# Patient Record
Sex: FEMALE | Race: WHITE | NOT HISPANIC OR LATINO | URBAN - METROPOLITAN AREA
[De-identification: names, ages, dates, MRNs, and addresses within clinical notes are randomized per-mention and may not be internally consistent; named-entity substitution may affect disease eponyms.]

---
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
## 2022-08-08 ENCOUNTER — OFFICE VISIT (OUTPATIENT)
Dept: FAMILY MEDICINE | Facility: CLINIC | Age: 27
End: 2022-08-08
Payer: COMMERCIAL

## 2022-08-08 VITALS
WEIGHT: 286 LBS | HEIGHT: 64 IN | RESPIRATION RATE: 16 BRPM | BODY MASS INDEX: 48.83 KG/M2 | HEART RATE: 74 BPM | SYSTOLIC BLOOD PRESSURE: 126 MMHG | DIASTOLIC BLOOD PRESSURE: 83 MMHG | TEMPERATURE: 97.9 F | OXYGEN SATURATION: 95 %

## 2022-08-08 DIAGNOSIS — Z12.4 SCREENING FOR CERVICAL CANCER: ICD-10-CM

## 2022-08-08 DIAGNOSIS — F32.A DEPRESSION, UNSPECIFIED DEPRESSION TYPE: ICD-10-CM

## 2022-08-08 DIAGNOSIS — Z13.220 LIPID SCREENING: ICD-10-CM

## 2022-08-08 DIAGNOSIS — Z11.3 ROUTINE SCREENING FOR STI (SEXUALLY TRANSMITTED INFECTION): ICD-10-CM

## 2022-08-08 DIAGNOSIS — F41.1 GENERALIZED ANXIETY DISORDER: ICD-10-CM

## 2022-08-08 DIAGNOSIS — Z11.59 NEED FOR HEPATITIS C SCREENING TEST: ICD-10-CM

## 2022-08-08 DIAGNOSIS — Z13.1 SCREENING FOR DIABETES MELLITUS: ICD-10-CM

## 2022-08-08 DIAGNOSIS — Z00.00 ROUTINE GENERAL MEDICAL EXAMINATION AT A HEALTH CARE FACILITY: Primary | ICD-10-CM

## 2022-08-08 DIAGNOSIS — Z11.4 SCREENING FOR HIV (HUMAN IMMUNODEFICIENCY VIRUS): ICD-10-CM

## 2022-08-08 LAB
CHOLEST SERPL-MCNC: 232 MG/DL
FASTING STATUS PATIENT QL REPORTED: YES
GLUCOSE SERPL-MCNC: 103 MG/DL (ref 70–99)
HDLC SERPL-MCNC: 41 MG/DL
LDLC SERPL CALC-MCNC: 146 MG/DL
NONHDLC SERPL-MCNC: 191 MG/DL
TRIGL SERPL-MCNC: 227 MG/DL

## 2022-08-08 PROCEDURE — 82947 ASSAY GLUCOSE BLOOD QUANT: CPT | Performed by: FAMILY MEDICINE

## 2022-08-08 PROCEDURE — 87389 HIV-1 AG W/HIV-1&-2 AB AG IA: CPT | Performed by: FAMILY MEDICINE

## 2022-08-08 PROCEDURE — G0145 SCR C/V CYTO,THINLAYER,RESCR: HCPCS | Performed by: FAMILY MEDICINE

## 2022-08-08 PROCEDURE — 87491 CHLMYD TRACH DNA AMP PROBE: CPT | Performed by: FAMILY MEDICINE

## 2022-08-08 PROCEDURE — 87591 N.GONORRHOEAE DNA AMP PROB: CPT | Performed by: FAMILY MEDICINE

## 2022-08-08 PROCEDURE — 80061 LIPID PANEL: CPT | Performed by: FAMILY MEDICINE

## 2022-08-08 PROCEDURE — 86803 HEPATITIS C AB TEST: CPT | Performed by: FAMILY MEDICINE

## 2022-08-08 ASSESSMENT — ENCOUNTER SYMPTOMS
PARESTHESIAS: 0
HEARTBURN: 0
NAUSEA: 0
CONSTIPATION: 0
NERVOUS/ANXIOUS: 1
WEAKNESS: 0
EYE PAIN: 0
FEVER: 0
ARTHRALGIAS: 0
ABDOMINAL PAIN: 0
SORE THROAT: 0
BREAST MASS: 0
SHORTNESS OF BREATH: 0
DIARRHEA: 0
DIZZINESS: 0
HEMATURIA: 0
FREQUENCY: 0
HEADACHES: 1
PALPITATIONS: 0
DYSURIA: 0
JOINT SWELLING: 0
HEMATOCHEZIA: 0
COUGH: 0
CHILLS: 0
MYALGIAS: 0

## 2022-08-08 ASSESSMENT — PATIENT HEALTH QUESTIONNAIRE - PHQ9
SUM OF ALL RESPONSES TO PHQ QUESTIONS 1-9: 14
SUM OF ALL RESPONSES TO PHQ QUESTIONS 1-9: 14
10. IF YOU CHECKED OFF ANY PROBLEMS, HOW DIFFICULT HAVE THESE PROBLEMS MADE IT FOR YOU TO DO YOUR WORK, TAKE CARE OF THINGS AT HOME, OR GET ALONG WITH OTHER PEOPLE: SOMEWHAT DIFFICULT
5. POOR APPETITE OR OVEREATING: MORE THAN HALF THE DAYS

## 2022-08-08 ASSESSMENT — ANXIETY QUESTIONNAIRES
7. FEELING AFRAID AS IF SOMETHING AWFUL MIGHT HAPPEN: MORE THAN HALF THE DAYS
5. BEING SO RESTLESS THAT IT IS HARD TO SIT STILL: NOT AT ALL
IF YOU CHECKED OFF ANY PROBLEMS ON THIS QUESTIONNAIRE, HOW DIFFICULT HAVE THESE PROBLEMS MADE IT FOR YOU TO DO YOUR WORK, TAKE CARE OF THINGS AT HOME, OR GET ALONG WITH OTHER PEOPLE: SOMEWHAT DIFFICULT
1. FEELING NERVOUS, ANXIOUS, OR ON EDGE: SEVERAL DAYS
GAD7 TOTAL SCORE: 10
GAD7 TOTAL SCORE: 10
2. NOT BEING ABLE TO STOP OR CONTROL WORRYING: SEVERAL DAYS
3. WORRYING TOO MUCH ABOUT DIFFERENT THINGS: MORE THAN HALF THE DAYS
6. BECOMING EASILY ANNOYED OR IRRITABLE: MORE THAN HALF THE DAYS

## 2022-08-08 ASSESSMENT — PAIN SCALES - GENERAL: PAINLEVEL: NO PAIN (0)

## 2022-08-08 NOTE — NURSING NOTE
"26 year old  Chief Complaint   Patient presents with     New Patient     Physical     Patient is fasting and wants to screen for STI and she is due for Pap smear. Also might need to replace her IUD or seek different options for birth control.       Blood pressure 126/83, pulse 74, temperature 97.9  F (36.6  C), temperature source Temporal, resp. rate 16, height 1.635 m (5' 4.37\"), weight 129.7 kg (286 lb), last menstrual period 07/15/2022, SpO2 95 %. Body mass index is 48.53 kg/m .  There is no problem list on file for this patient.      Wt Readings from Last 2 Encounters:   08/08/22 129.7 kg (286 lb)     BP Readings from Last 3 Encounters:   08/08/22 126/83         Current Outpatient Medications   Medication     buPROPion HCl (WELLBUTRIN PO)     levonorgestrel (KYLEENA) 19.5 MG IUD     Multiple Vitamin (MULTIVITAMINS PO)     SERTRALINE HCL PO     No current facility-administered medications for this visit.       Social History     Tobacco Use     Smoking status: Never Smoker     Smokeless tobacco: Never Used   Substance Use Topics     Alcohol use: Yes     Comment: less than 1 drink a week.     Drug use: Yes     Types: Other     Comment: Delta 8 gummie, 5 mg, 4 times a week.       Health Maintenance Due   Topic Date Due     PREVENTIVE CARE VISIT  Never done     ADVANCE CARE PLANNING  Never done     HIV SCREENING  Never done     HEPATITIS C SCREENING  Never done     PAP  Never done       No results found for: PAP      Lauren Espinosa CMA, [facility]  August 8, 2022 9:31 AM  "

## 2022-08-09 LAB
C TRACH DNA SPEC QL NAA+PROBE: NEGATIVE
HCV AB SERPL QL IA: NONREACTIVE
HIV 1+2 AB+HIV1 P24 AG SERPL QL IA: NONREACTIVE
N GONORRHOEA DNA SPEC QL NAA+PROBE: NEGATIVE

## 2022-08-09 ASSESSMENT — ENCOUNTER SYMPTOMS
JOINT SWELLING: 0
HEMATURIA: 0
SORE THROAT: 0
FREQUENCY: 0
WEAKNESS: 0
PARESTHESIAS: 0
ARTHRALGIAS: 0
MYALGIAS: 0
DIZZINESS: 0
PALPITATIONS: 0
COUGH: 0
SHORTNESS OF BREATH: 0
CONSTIPATION: 0
FEVER: 0
CHILLS: 0
NAUSEA: 0
HEARTBURN: 0
BREAST MASS: 0
NERVOUS/ANXIOUS: 1
ABDOMINAL PAIN: 0
DYSURIA: 0
HEADACHES: 1
EYE PAIN: 0
HEMATOCHEZIA: 0
DIARRHEA: 0

## 2022-08-10 LAB
BKR LAB AP GYN ADEQUACY: NORMAL
BKR LAB AP GYN INTERPRETATION: NORMAL
BKR LAB AP HPV REFLEX: NORMAL
BKR LAB AP LMP: NORMAL
BKR LAB AP PREVIOUS ABNORMAL: NORMAL
PATH REPORT.COMMENTS IMP SPEC: NORMAL
PATH REPORT.COMMENTS IMP SPEC: NORMAL
PATH REPORT.RELEVANT HX SPEC: NORMAL

## 2022-10-03 ENCOUNTER — HEALTH MAINTENANCE LETTER (OUTPATIENT)
Age: 27
End: 2022-10-03

## 2022-10-05 ENCOUNTER — E-VISIT (OUTPATIENT)
Dept: URGENT CARE | Facility: URGENT CARE | Age: 27
End: 2022-10-05
Payer: COMMERCIAL

## 2022-10-05 DIAGNOSIS — L20.9 ATOPIC DERMATITIS, UNSPECIFIED TYPE: Primary | ICD-10-CM

## 2022-10-05 PROCEDURE — 99421 OL DIG E/M SVC 5-10 MIN: CPT | Performed by: PHYSICIAN ASSISTANT

## 2022-10-05 RX ORDER — TRIAMCINOLONE ACETONIDE 0.25 MG/G
OINTMENT TOPICAL 2 TIMES DAILY
Qty: 15 G | Refills: 1 | Status: SHIPPED | OUTPATIENT
Start: 2022-10-05 | End: 2022-12-16

## 2022-10-16 NOTE — PROGRESS NOTES
ASSESSMENT AND PLAN:     (B35.4) Tinea corporis  (primary encounter diagnosis)  Comment: Acute uncomplicated.  History and exam consistent with tinea. Treat with topical antifungal  Plan: ketoconazole (NIZORAL) 2 % external cream          (Z11.3) Routine screening for STI (sexually transmitted infection)  Comment: Plan: Chlamydia trachomatis/Neisseria gonorrhoeae by         PCR, Chlamydia trachomatis/Neisseria         gonorrhoeae by PCR          Alberto Quinonez MD   [facility]  10/17/2022, 3:05 PM      SUBJECTIVE:   Briana is a 26 year old female who presents to clinic today for a return visit.    - partner recently informed her of having HSV-2    - recent developed an itchy red scaly flaky area on inside of right knee  - noticed around the 3rd or 4th  - noticed   - improving    - no changes in vaginal discharge  - no vaginal itching or irritation        There is no problem list on file for this patient.    Current Outpatient Medications   Medication     buPROPion HCl (WELLBUTRIN PO)     levonorgestrel (KYLEENA) 19.5 MG IUD     Multiple Vitamin (MULTIVITAMINS PO)     SERTRALINE HCL PO     triamcinolone (KENALOG) 0.025 % external ointment     No current facility-administered medications for this visit.       I have reviewed the patient's relevant past medical history.     OBJECTIVE:   /80 (BP Location: Right arm, Patient Position: Sitting, Cuff Size: Adult Large)   Pulse 90   Temp 97.9  F (36.6  C) (Temporal)   Resp 12   SpO2 97%     Constitutional: well-appearing, appears stated age  Eyes: conjunctivae without erythema, sclera anicteric.   Skin:  approx 2cm erythematous plaque medial right knee with ring of scale - small similar lesion distally. Lesions touch if knee is fully flexed  Psych: affect is full and appropriate, speech is fluent and non-pressured

## 2022-10-17 ENCOUNTER — OFFICE VISIT (OUTPATIENT)
Dept: FAMILY MEDICINE | Facility: CLINIC | Age: 27
End: 2022-10-17
Payer: COMMERCIAL

## 2022-10-17 VITALS
SYSTOLIC BLOOD PRESSURE: 137 MMHG | HEART RATE: 90 BPM | OXYGEN SATURATION: 97 % | TEMPERATURE: 97.9 F | RESPIRATION RATE: 12 BRPM | DIASTOLIC BLOOD PRESSURE: 80 MMHG

## 2022-10-17 DIAGNOSIS — Z11.3 ROUTINE SCREENING FOR STI (SEXUALLY TRANSMITTED INFECTION): ICD-10-CM

## 2022-10-17 DIAGNOSIS — B35.4 TINEA CORPORIS: Primary | ICD-10-CM

## 2022-10-17 PROCEDURE — 87491 CHLMYD TRACH DNA AMP PROBE: CPT | Mod: 59 | Performed by: FAMILY MEDICINE

## 2022-10-17 RX ORDER — KETOCONAZOLE 20 MG/G
CREAM TOPICAL
Qty: 30 G | Refills: 0 | Status: SHIPPED | OUTPATIENT
Start: 2022-10-17 | End: 2022-12-16

## 2022-10-17 NOTE — NURSING NOTE
"Injectable Influenza Immunization Documentation    1.  Has the patient received the information for the injectable influenza vaccine? YES     2. Is the patient 6 months of age or older? YES     3. Does the patient have any of the following contraindications?         Severe allergy to eggs? No     Severe allergic reaction to previous influenza vaccines? No   Severe allergy to latex? No       History of Guillain-Mary D syndrome? No     Currently have a temperature greater than 100.4F? No        4.  Severely egg allergic patients should have flu vaccine eligibility assessed by an MD, RN, or pharmacist, and those who received flu vaccine should be observed for 15 min by an MD, RN, Pharmacist, Medical Technician, or member of clinic staff.\": YES    5. Latex-allergic patients should be given latex-free influenza vaccine Yes. Please reference the Vaccine latex table to determine if your clinic s product is latex-containing.       Vaccination given by Ana Lezama MA        "

## 2022-10-17 NOTE — NURSING NOTE
26 year old  Chief Complaint   Patient presents with     STD     STI testing. Last week of september.        Blood pressure 137/80, pulse 90, temperature 97.9  F (36.6  C), temperature source Temporal, resp. rate 12, SpO2 97 %. There is no height or weight on file to calculate BMI.  There is no problem list on file for this patient.      Wt Readings from Last 2 Encounters:   08/08/22 129.7 kg (286 lb)     BP Readings from Last 3 Encounters:   10/17/22 137/80   08/08/22 126/83         Current Outpatient Medications   Medication     buPROPion HCl (WELLBUTRIN PO)     levonorgestrel (KYLEENA) 19.5 MG IUD     Multiple Vitamin (MULTIVITAMINS PO)     SERTRALINE HCL PO     triamcinolone (KENALOG) 0.025 % external ointment     No current facility-administered medications for this visit.       Social History     Tobacco Use     Smoking status: Never     Smokeless tobacco: Never   Substance Use Topics     Alcohol use: Yes     Comment: less than 1 drink a week.     Drug use: Yes     Types: Other     Comment: Delta 8 gummie, 5 mg, 4 times a week.       Health Maintenance Due   Topic Date Due     ADVANCE CARE PLANNING  Never done     COVID-19 Vaccine (4 - Booster for Moderna series) 03/11/2022     INFLUENZA VACCINE (1) 09/01/2022       No results found for: PAP      October 17, 2022 2:40 PM

## 2022-10-18 LAB
C TRACH DNA SPEC QL PROBE+SIG AMP: NEGATIVE
C TRACH DNA SPEC QL PROBE+SIG AMP: NEGATIVE
N GONORRHOEA DNA SPEC QL NAA+PROBE: NEGATIVE
N GONORRHOEA DNA SPEC QL NAA+PROBE: NEGATIVE

## 2022-12-16 ENCOUNTER — OFFICE VISIT (OUTPATIENT)
Dept: URGENT CARE | Facility: URGENT CARE | Age: 27
End: 2022-12-16
Payer: COMMERCIAL

## 2022-12-16 ENCOUNTER — ANCILLARY PROCEDURE (OUTPATIENT)
Dept: GENERAL RADIOLOGY | Facility: CLINIC | Age: 27
End: 2022-12-16
Attending: NURSE PRACTITIONER
Payer: COMMERCIAL

## 2022-12-16 VITALS
RESPIRATION RATE: 16 BRPM | HEART RATE: 97 BPM | WEIGHT: 285 LBS | OXYGEN SATURATION: 95 % | TEMPERATURE: 98 F | SYSTOLIC BLOOD PRESSURE: 136 MMHG | DIASTOLIC BLOOD PRESSURE: 86 MMHG | BODY MASS INDEX: 48.36 KG/M2

## 2022-12-16 DIAGNOSIS — E66.01 MORBID OBESITY (H): ICD-10-CM

## 2022-12-16 DIAGNOSIS — R05.1 ACUTE COUGH: ICD-10-CM

## 2022-12-16 DIAGNOSIS — J18.9 PNEUMONIA OF RIGHT LOWER LOBE DUE TO INFECTIOUS ORGANISM: Primary | ICD-10-CM

## 2022-12-16 PROBLEM — G43.109 MIGRAINE WITH AURA AND WITHOUT STATUS MIGRAINOSUS, NOT INTRACTABLE: Status: ACTIVE | Noted: 2018-01-19

## 2022-12-16 PROBLEM — F32.A DEPRESSION: Status: ACTIVE | Noted: 2022-12-16

## 2022-12-16 LAB
BASOPHILS # BLD AUTO: 0 10E3/UL (ref 0–0.2)
BASOPHILS NFR BLD AUTO: 0 %
EOSINOPHIL # BLD AUTO: 0.1 10E3/UL (ref 0–0.7)
EOSINOPHIL NFR BLD AUTO: 1 %
ERYTHROCYTE [DISTWIDTH] IN BLOOD BY AUTOMATED COUNT: 13.5 % (ref 10–15)
HCT VFR BLD AUTO: 37.6 % (ref 35–47)
HGB BLD-MCNC: 12.1 G/DL (ref 11.7–15.7)
IMM GRANULOCYTES # BLD: 0.1 10E3/UL
IMM GRANULOCYTES NFR BLD: 1 %
LYMPHOCYTES # BLD AUTO: 3.6 10E3/UL (ref 0.8–5.3)
LYMPHOCYTES NFR BLD AUTO: 31 %
MCH RBC QN AUTO: 26.5 PG (ref 26.5–33)
MCHC RBC AUTO-ENTMCNC: 32.2 G/DL (ref 31.5–36.5)
MCV RBC AUTO: 83 FL (ref 78–100)
MONOCYTES # BLD AUTO: 0.5 10E3/UL (ref 0–1.3)
MONOCYTES NFR BLD AUTO: 4 %
NEUTROPHILS # BLD AUTO: 7.3 10E3/UL (ref 1.6–8.3)
NEUTROPHILS NFR BLD AUTO: 63 %
PLATELET # BLD AUTO: 355 10E3/UL (ref 150–450)
RBC # BLD AUTO: 4.56 10E6/UL (ref 3.8–5.2)
WBC # BLD AUTO: 11.6 10E3/UL (ref 4–11)

## 2022-12-16 PROCEDURE — 71046 X-RAY EXAM CHEST 2 VIEWS: CPT | Mod: TC | Performed by: RADIOLOGY

## 2022-12-16 PROCEDURE — 99214 OFFICE O/P EST MOD 30 MIN: CPT | Performed by: NURSE PRACTITIONER

## 2022-12-16 PROCEDURE — 85025 COMPLETE CBC W/AUTO DIFF WBC: CPT | Performed by: NURSE PRACTITIONER

## 2022-12-16 PROCEDURE — 36415 COLL VENOUS BLD VENIPUNCTURE: CPT | Performed by: NURSE PRACTITIONER

## 2022-12-16 NOTE — PATIENT INSTRUCTIONS
Take all medications as prescribed.    Go to the emergency room if you develop severe shortness of breath.

## 2022-12-16 NOTE — PROGRESS NOTES
Cough since beginning of November, loose productive, chest pain with coughing.    Chief Complaint   Patient presents with     Urgent Care     Sick since Oct. With cold, and positive for Covid, c/o cough with mucus. This week cough with chest pain.          ICD-10-CM    1. Pneumonia of right lower lobe due to infectious organism  J18.9 amoxicillin-clavulanate (AUGMENTIN) 875-125 MG tablet      2. Acute cough  R05.1 XR Chest 2 Views     CBC with platelets and differential     CBC with platelets and differential      3. Morbid obesity (H)  E66.01         Antibiotics as prescribed.  Increase fluid intake and rest.  Go to the emergency room if symptoms worsen.  Otherwise recheck with your primary care provider in a couple of weeks      CXR - Reviewed and interpreted by me right lower lobe opacities consistent with probable pneumonia    Results for orders placed or performed in visit on 12/16/22 (from the past 24 hour(s))   CBC with platelets and differential    Narrative    The following orders were created for panel order CBC with platelets and differential.  Procedure                               Abnormality         Status                     ---------                               -----------         ------                     CBC with platelets and d...[[id number]]  Abnormal            Final result                 Please view results for these tests on the individual orders.   CBC with platelets and differential   Result Value Ref Range    WBC Count 11.6 (H) 4.0 - 11.0 10e3/uL    RBC Count 4.56 3.80 - 5.20 10e6/uL    Hemoglobin 12.1 11.7 - 15.7 g/dL    Hematocrit 37.6 35.0 - 47.0 %    MCV 83 78 - 100 fL    MCH 26.5 26.5 - 33.0 pg    MCHC 32.2 31.5 - 36.5 g/dL    RDW 13.5 10.0 - 15.0 %    Platelet Count 355 150 - 450 10e3/uL    % Neutrophils 63 %    % Lymphocytes 31 %    % Monocytes 4 %    % Eosinophils 1 %    % Basophils 0 %    % Immature Granulocytes 1 %    Absolute Neutrophils 7.3 1.6 - 8.3 10e3/uL    Absolute  Lymphocytes 3.6 0.8 - 5.3 10e3/uL    Absolute Monocytes 0.5 0.0 - 1.3 10e3/uL    Absolute Eosinophils 0.1 0.0 - 0.7 10e3/uL    Absolute Basophils 0.0 0.0 - 0.2 10e3/uL    Absolute Immature Granulocytes 0.1 <=0.4 10e3/uL       Subjective     Briana Gordon is an 27 year old female who presents to clinic today for recurrent upper respiratory infections.  Most recently she has been sick with runny nose, loose cough for the last couple of weeks.  Symptoms have mostly resolved except for the cough which is productive.  She thinks she has had a cough since the beginning of November and has now developed some pain in her chest with coughing      ROS: 10 point ROS neg other than the symptoms noted above in the HPI.       Objective    /86   Pulse 97   Temp 98  F (36.7  C) (Oral)   Resp 16   Wt 129.3 kg (285 lb)   SpO2 95%   BMI 48.36 kg/m    Nurses notes and VS have been reviewed.    Physical Exam         GENERAL APPEARANCE: alert and mild distress     EYES: PERRL, EOMI, sclera non-icteric     HENT: ear canals and TM's normal, nose and mouth without ulcers or lesions and rhinorrhea clear     NECK: no adenopathy or asymmetry, thyroid normal to palpation     RESP: Clear other than decreased breath sounds in the right lower lobe     CV: regular rates and rhythm, no murmurs, rubs, or gallop     SKIN: no suspicious lesions or rashes    Patient Instructions   Take all medications as prescribed.    Go to the emergency room if you develop severe shortness of breath.          PONCE Collins, CNP  [facility] Provider    The use of [employer] dictation services may have been used to construct the content in this note; any grammatical or spelling errors are non-intentional. Please contact the author of this note directly if you are in need of any clarification.

## 2023-01-02 ENCOUNTER — IMMUNIZATION (OUTPATIENT)
Dept: FAMILY MEDICINE | Facility: CLINIC | Age: 28
End: 2023-01-02
Payer: COMMERCIAL

## 2023-01-02 ENCOUNTER — OFFICE VISIT (OUTPATIENT)
Dept: FAMILY MEDICINE | Facility: CLINIC | Age: 28
End: 2023-01-02
Payer: COMMERCIAL

## 2023-01-02 VITALS
OXYGEN SATURATION: 94 % | TEMPERATURE: 97.3 F | SYSTOLIC BLOOD PRESSURE: 125 MMHG | HEART RATE: 85 BPM | DIASTOLIC BLOOD PRESSURE: 86 MMHG

## 2023-01-02 DIAGNOSIS — F32.A DEPRESSION, UNSPECIFIED DEPRESSION TYPE: Primary | ICD-10-CM

## 2023-01-02 DIAGNOSIS — J01.90 ACUTE SINUSITIS WITH SYMPTOMS > 10 DAYS: Primary | ICD-10-CM

## 2023-01-02 DIAGNOSIS — F41.1 GENERALIZED ANXIETY DISORDER: ICD-10-CM

## 2023-01-02 DIAGNOSIS — R05.3 CHRONIC COUGH: ICD-10-CM

## 2023-01-02 DIAGNOSIS — Z23 NEED FOR COVID-19 VACCINE: Primary | ICD-10-CM

## 2023-01-02 PROCEDURE — 91313 COVID-19 VACCINE BIVALENT BOOSTER 18+ (MODERNA): CPT

## 2023-01-02 PROCEDURE — 0134A COVID-19 VACCINE BIVALENT BOOSTER 18+ (MODERNA): CPT

## 2023-01-02 PROCEDURE — 99207 PR NO CHARGE NURSE ONLY: CPT

## 2023-01-02 RX ORDER — ALBUTEROL SULFATE 90 UG/1
2 AEROSOL, METERED RESPIRATORY (INHALATION) EVERY 6 HOURS PRN
Qty: 18 G | Refills: 0 | Status: SHIPPED | OUTPATIENT
Start: 2023-01-02 | End: 2023-01-30

## 2023-01-02 RX ORDER — BUPROPION HYDROCHLORIDE 150 MG/1
150 TABLET ORAL EVERY MORNING
Status: CANCELLED | OUTPATIENT
Start: 2023-01-02

## 2023-01-02 RX ORDER — BUPROPION HYDROCHLORIDE 150 MG/1
150 TABLET ORAL EVERY MORNING
COMMUNITY
End: 2023-01-02

## 2023-01-02 RX ORDER — FLUTICASONE PROPIONATE 50 MCG
1-2 SPRAY, SUSPENSION (ML) NASAL DAILY
Qty: 11.1 ML | Refills: 0 | Status: SHIPPED | OUTPATIENT
Start: 2023-01-02 | End: 2023-01-30

## 2023-01-02 RX ORDER — ECHINACEA PURPUREA EXTRACT 125 MG
TABLET ORAL
Qty: 30 ML | Refills: 0 | Status: SHIPPED | OUTPATIENT
Start: 2023-01-02 | End: 2023-08-15

## 2023-01-02 RX ORDER — DOXYCYCLINE 100 MG/1
100 CAPSULE ORAL 2 TIMES DAILY
Qty: 14 CAPSULE | Refills: 0 | Status: SHIPPED | OUTPATIENT
Start: 2023-01-02 | End: 2023-01-30

## 2023-01-02 ASSESSMENT — ANXIETY QUESTIONNAIRES
IF YOU CHECKED OFF ANY PROBLEMS ON THIS QUESTIONNAIRE, HOW DIFFICULT HAVE THESE PROBLEMS MADE IT FOR YOU TO DO YOUR WORK, TAKE CARE OF THINGS AT HOME, OR GET ALONG WITH OTHER PEOPLE: SOMEWHAT DIFFICULT
7. FEELING AFRAID AS IF SOMETHING AWFUL MIGHT HAPPEN: NOT AT ALL
2. NOT BEING ABLE TO STOP OR CONTROL WORRYING: SEVERAL DAYS
5. BEING SO RESTLESS THAT IT IS HARD TO SIT STILL: SEVERAL DAYS
GAD7 TOTAL SCORE: 7
GAD7 TOTAL SCORE: 7
3. WORRYING TOO MUCH ABOUT DIFFERENT THINGS: SEVERAL DAYS
6. BECOMING EASILY ANNOYED OR IRRITABLE: MORE THAN HALF THE DAYS
1. FEELING NERVOUS, ANXIOUS, OR ON EDGE: SEVERAL DAYS

## 2023-01-02 ASSESSMENT — PATIENT HEALTH QUESTIONNAIRE - PHQ9
SUM OF ALL RESPONSES TO PHQ QUESTIONS 1-9: 8
5. POOR APPETITE OR OVEREATING: SEVERAL DAYS

## 2023-01-02 NOTE — NURSING NOTE
27 year old  Chief Complaint   Patient presents with     RECHECK     Pt was diagnosed with Pneumonia, she finished the antibiotics last week but still has a cough that has flym in especially when lying down.     Depression     Pt needs refills on setraline and wellbutrin       Blood pressure 125/86, pulse 85, temperature 97.3  F (36.3  C), SpO2 94 %. There is no height or weight on file to calculate BMI.  Patient Active Problem List   Diagnosis     Depression     CAROLINE (generalized anxiety disorder)     Migraine with aura and without status migrainosus, not intractable     Non morbid obesity due to excess calories     Morbid obesity (H)       Wt Readings from Last 2 Encounters:   12/16/22 129.3 kg (285 lb)   08/08/22 129.7 kg (286 lb)     BP Readings from Last 3 Encounters:   01/02/23 125/86   12/16/22 136/86   10/17/22 137/80         Current Outpatient Medications   Medication     buPROPion (WELLBUTRIN XL) 150 MG 24 hr tablet     buPROPion HCl (WELLBUTRIN PO)     levonorgestrel (KYLEENA) 19.5 MG IUD     Multiple Vitamin (MULTIVITAMINS PO)     SERTRALINE HCL PO     amoxicillin-clavulanate (AUGMENTIN) 875-125 MG tablet     No current facility-administered medications for this visit.       Social History     Tobacco Use     Smoking status: Never     Smokeless tobacco: Never   Substance Use Topics     Alcohol use: Yes     Comment: less than 1 drink a week.     Drug use: Yes     Types: Other     Comment: Delta 8 gummie, 5 mg, 4 times a week.       Health Maintenance Due   Topic Date Due     ADVANCE CARE PLANNING  Never done     PHQ-2 (once per calendar year)  01/01/2023       No results found for: PAP      January 2, 2023 4:01 PM

## 2023-01-02 NOTE — PROGRESS NOTES
Prior to immunization administration, verified patients identity using patient s name and date of birth. Please see Immunization Activity for additional information.     Screening Questionnaire for Adult Immunization    Are you sick today?   No   Do you have allergies to medications, food, a vaccine component or latex?   No   Have you ever had a serious reaction after receiving a vaccination?   No   Do you have a long-term health problem with heart, lung, kidney, or metabolic disease (e.g., diabetes), asthma, a blood disorder, no spleen, complement component deficiency, a cochlear implant, or a spinal fluid leak?  Are you on long-term aspirin therapy?   No   Do you have cancer, leukemia, HIV/AIDS, or any other immune system problem?   No   Do you have a parent, brother, or sister with an immune system problem?   No   In the past 3 months, have you taken medications that affect  your immune system, such as prednisone, other steroids, or anticancer drugs; drugs for the treatment of rheumatoid arthritis, Crohn s disease, or psoriasis; or have you had radiation treatments?   No   Have you had a seizure, or a brain or other nervous system problem?   No   During the past year, have you received a transfusion of blood or blood    products, or been given immune (gamma) globulin or antiviral drug?   No   For women: Are you pregnant or is there a chance you could become       pregnant during the next month?   No   Have you received any vaccinations in the past 4 weeks?   No     Immunization questionnaire answers were all negative.        Per orders of Dr. Jimenez, injection of bivalent booster moderna given by Ailyn Knox MA. Patient instructed to remain in clinic for 15 minutes afterwards, and to report any adverse reaction to me immediately.       Screening performed by Ailyn Knox MA on 1/2/2023 at 9:40 AM.

## 2023-01-02 NOTE — PROGRESS NOTES
Medical assistant intake:  Briana Gordon is a 27 year old female who presents to [facility] today for RECHECK (Pt was diagnosed with Pneumonia, she finished the antibiotics last week but still has a cough that has flym in especially when lying down.) and Depression (Pt needs refills on setraline and wellbutrin)        ASSESSMENT/PLAN:    Briana is here with a persistent productive cough after being treated with Augmentin for a pneumonia.  She also now has symptoms of sinusitis  PLAN  1. Treat sinusitis with:   a. Doxycyline twice daily for 7 days  b. flonase and saline nasal sprays  2. Treat cough with:  a. Albuteral inhaler as needed every 4-6 hours       3. Depression and Anxiety in need of us taking over her medications.   - Will have staff send Rx for Zoloft 50 mg and Wellbutrin  mg to Dr. Quinonez.   She's been on these for about 2 years consistently from an outside clinic    Follow-up If symptoms of cough and sinusitis do not improve.     --Curtis Thapa MD      SUBJECTIVE:   Jazmine is a 27 who has been ill with URI symptoms since early October. First it was a simple URI, then Covid, then another URI. Her coughing persisted. On 12/16 (about 18 days ago) had an X-ray which suggested bibasilar infiltrates. She was treated with Augmentin and had some improvement but now is with nasal congestion and a productive cough.     No fevers or sweats or chills.     Review Of Systems:  Also in need of a refill of Zoloft 50 mg/day and Wellbutrin  mg/day.   Has otherwise been in usual state of health, e.g.   Cardiovascular: negative  Gastrointestinal: negative  Genitourinary: negative    Problem list per EMR:  Patient Active Problem List   Diagnosis     Depression     CAROLINE (generalized anxiety disorder)     Migraine with aura and without status migrainosus, not intractable     Non morbid obesity due to excess calories     Morbid obesity (H)       Current Outpatient Medications   Medication Sig Dispense  Refill     albuterol (PROAIR HFA/PROVENTIL HFA/VENTOLIN HFA) 108 (90 Base) MCG/ACT inhaler Inhale 2 puffs into the lungs every 6 hours as needed for shortness of breath, wheezing or cough 18 g 0     buPROPion (WELLBUTRIN XL) 150 MG 24 hr tablet Take 150 mg by mouth every morning       buPROPion HCl (WELLBUTRIN PO) Take 150 mg by mouth daily Patient doesn't remember the dosages.       doxycycline hyclate (VIBRAMYCIN) 100 MG capsule Take 1 capsule (100 mg) by mouth 2 times daily 14 capsule 0     fluticasone (FLONASE) 50 MCG/ACT nasal spray Spray 1-2 sprays into both nostrils daily 11.1 mL 0     levonorgestrel (KYLEENA) 19.5 MG IUD 19.5 mg by Intrauterine route       Multiple Vitamin (MULTIVITAMINS PO)        SERTRALINE HCL PO Take 50 mg by mouth daily Patient doesn't remember the dosages.       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  Appears well.   Tms are normal.   Nasal passages are with swollen turbinates, RIGHT > LEFT  Maxillary sinus areas are tender  Oropharynx - clear  Neck is supple and without lymphadenopathy    CV: RRR. No murmurs  Lungs: Clear except for an occasional LEFT lower lung expiration high pitched wheeze that cleared with deep breaths.     PHQ 1/2/2023   PHQ-9 Total Score 8   Q9: Thoughts of better off dead/self-harm past 2 weeks Not at all   F/U: Thoughts of suicide or self-harm -   F/U: Safety concerns -     CAROLINE-7 SCORE 8/8/2022 1/2/2023   Total Score 10 7           SEE TOP OF NOTE FOR ASSESSMENT AND PLAN    --Curtis Thapa MD  [facility],  Department of Family Medicine and Community [facility]

## 2023-01-02 NOTE — PATIENT INSTRUCTIONS
ASSESSMENT/PLAN:    Briana is here with a persistent productive cough after being treated with Augmentin for a pneumonia.  She also now has symptoms of sinusitis  PLAN  Treat sinusitis with:   Doxycyline twice daily for 7 days  flonase and saline nasal sprays  Treat cough with:  Albuteral inhaler as needed every 4-6 hours       3. Depression and Anxiety in need of us taking over her medications.   - Will have staff send Rx for Zoloft 50 mg and Wellbutrin  mg to Dr. Quinonez.   She's been on these for about 2 years consistently from an outside clinic    Follow-up If symptoms of cough and sinusitis do not improve.     --Curtis Thapa MD

## 2023-01-03 RX ORDER — BUPROPION HYDROCHLORIDE 150 MG/1
150 TABLET ORAL EVERY MORNING
Qty: 90 TABLET | Refills: 0 | Status: SHIPPED | OUTPATIENT
Start: 2023-01-03 | End: 2023-04-04

## 2023-01-03 NOTE — TELEPHONE ENCOUNTER
Last visit 1/2/23, no future visit  Routing refill request to provider for review/approval because:  Sending to PCP, Dr Quinonez- meds previously prescribed by another provider. Asked Dr Thapa in visit yesterday, to refill meds; he is asking Dr Quinonez to refill meds, as our clinic has not ever prescribed these.   Celena Mujica RN  [facility]

## 2023-01-06 ENCOUNTER — TELEPHONE (OUTPATIENT)
Dept: FAMILY MEDICINE | Facility: CLINIC | Age: 28
End: 2023-01-06

## 2023-01-06 DIAGNOSIS — R73.01 ELEVATED FASTING GLUCOSE: ICD-10-CM

## 2023-01-06 DIAGNOSIS — E78.5 DYSLIPIDEMIA: Primary | ICD-10-CM

## 2023-01-06 NOTE — TELEPHONE ENCOUNTER
Who is calling? Patient  Reason for Call:Requesting lab orders from PCP before appointment on 1/9 @  8:30

## 2023-01-09 ENCOUNTER — LAB (OUTPATIENT)
Dept: LAB | Facility: CLINIC | Age: 28
End: 2023-01-09
Payer: COMMERCIAL

## 2023-01-09 DIAGNOSIS — E78.5 DYSLIPIDEMIA: ICD-10-CM

## 2023-01-09 DIAGNOSIS — R73.01 ELEVATED FASTING GLUCOSE: ICD-10-CM

## 2023-01-09 LAB
CHOLEST SERPL-MCNC: 210 MG/DL
FASTING STATUS PATIENT QL REPORTED: YES
GLUCOSE SERPL-MCNC: 122 MG/DL (ref 70–99)
HDLC SERPL-MCNC: 48 MG/DL
LDLC SERPL CALC-MCNC: 116 MG/DL
NONHDLC SERPL-MCNC: 162 MG/DL
TRIGL SERPL-MCNC: 228 MG/DL

## 2023-01-09 PROCEDURE — 80061 LIPID PANEL: CPT | Mod: ORL | Performed by: FAMILY MEDICINE

## 2023-01-09 PROCEDURE — 82947 ASSAY GLUCOSE BLOOD QUANT: CPT | Mod: ORL | Performed by: FAMILY MEDICINE

## 2023-01-11 ENCOUNTER — MYC MEDICAL ADVICE (OUTPATIENT)
Dept: FAMILY MEDICINE | Facility: CLINIC | Age: 28
End: 2023-01-11

## 2023-01-11 DIAGNOSIS — E78.5 DYSLIPIDEMIA: ICD-10-CM

## 2023-01-11 DIAGNOSIS — R73.01 ELEVATED FASTING GLUCOSE: ICD-10-CM

## 2023-01-12 NOTE — PROGRESS NOTES
Assessment & Plan   Problem List Items Addressed This Visit    None  Visit Diagnoses     Bronchitis    -  Primary    Relevant Medications    fluticasone-salmeterol (ADVAIR HFA) 115-21 MCG/ACT inhaler        Jazmine is a 27 year old female with a PMH of juvenile asthma, COVID in October, and multiple recent URIs that presents with 4-5 months of mucus-producing cough and nasal congestion. DDx includes most likely asthma vs less likely viral sinusitis. Since 1/2/23, the albuterol inhaler and nasal spray have significantly improved her symptoms. Remains afebrile. Trial of ICS+LABA.     If no improvement with fluticasone-salmeterol treatment, consider PFTs.          Dov Best, MS3  [facility] Medical Student    I was present with the medical student who participated in the service and in the documentation of the note. I have verified the history and personally performed the physical exam and medical decision making. I agree with the assessment and plan of care as documented in the note.     24 minutes spent on the date of the encounter doing chart review, history and exam, documentation and further activities as noted.      Chris Carrillo MD  5:04 PM, January 13, 2023      Jessica Warren is a 27 year old with recent history of multiple URIs and COVID, presenting for the following health issues:  Follow Up (PNA -- has not subsided with 2 rounds of antibiotics.)    Patient had a URI in October that lasted about 2 weeks, followed by COVID for 2 weeks, and another URI for 2 weeks immediately after. No symptom improvement occurred, and a chest XR revealed pneumonia on 12/16/22 where she received a 10 day course of ABx. The patient returned on 1/2/23 and received 7 days of doxy, nasal spray, and an albuterol inhaler. Her current symptoms include a  mucus-producing cough, a chest rattle with wheezy breathing, and significant sinus drainage. Her throat has been dry and irritated due to the cough.     Both  "the nasal spray and inhaler have significantly improved her symptoms, but she noticed the most change with the inhaler. She has been using it 2-3 times/day and it is effective for 4-6 hours. It doesn't completely stop her cough but it becomes much less frequent when inhaler is being used. She has been able to sleep about 7 hours each night, which is significantly improved from when she had symptoms last fall.     No known allergies (food or environmental).     She denies chest pain, abdominal pain, and palpitations.     HPI   \"ongoing pneumonia symptoms\"    Review of Systems   Constitutional, HEENT, cardiovascular, pulmonary, GI, , musculoskeletal, neuro, skin, endocrine and psych systems are negative, except as otherwise noted.      Objective    /82 (BP Location: Right arm, Patient Position: Sitting, Cuff Size: Adult Large)   Pulse 90   Temp 97.8  F (36.6  C) (Temporal)   Resp 18   Ht 1.635 m (5' 4.37\")   SpO2 98%   BMI 48.36 kg/m    Body mass index is 48.36 kg/m .     Physical Exam   GENERAL: Healthy, alert and no apparent distress.  EYES: Eyes grossly normal to inspection, PERRL and conjunctivae and sclerae normal.  HENT: Ear canals and TM's normal, nose and mouth without ulcers or lesions.  RESP: Lungs clear to auscultation - no rales, rhonchi or wheezes.   CV: Regular rate and rhythm, normal S1 S2, no S3 or S4, no murmur, click or rub, no peripheral edema and peripheral pulses strong.  SKIN: No suspicious lesions or rashes  NEURO: Normal strength and tone, mentation intact and speech normal.  PSYCH: Mentation appears normal, affect normal/bright.         "

## 2023-01-13 ENCOUNTER — OFFICE VISIT (OUTPATIENT)
Dept: FAMILY MEDICINE | Facility: CLINIC | Age: 28
End: 2023-01-13
Payer: COMMERCIAL

## 2023-01-13 VITALS
TEMPERATURE: 97.8 F | RESPIRATION RATE: 18 BRPM | BODY MASS INDEX: 48.36 KG/M2 | OXYGEN SATURATION: 98 % | HEART RATE: 90 BPM | HEIGHT: 64 IN | SYSTOLIC BLOOD PRESSURE: 129 MMHG | DIASTOLIC BLOOD PRESSURE: 82 MMHG

## 2023-01-13 DIAGNOSIS — J40 BRONCHITIS: Primary | ICD-10-CM

## 2023-01-13 RX ORDER — FLUTICASONE PROPIONATE AND SALMETEROL XINAFOATE 115; 21 UG/1; UG/1
2 AEROSOL, METERED RESPIRATORY (INHALATION) 2 TIMES DAILY
Qty: 12 G | Refills: 1 | Status: SHIPPED | OUTPATIENT
Start: 2023-01-13 | End: 2023-03-30

## 2023-01-13 NOTE — NURSING NOTE
"27 year old  Chief Complaint   Patient presents with     Follow Up     PNA -- has not subsided with 2 rounds of antibiotics.       Blood pressure 129/82, pulse 90, temperature 97.8  F (36.6  C), temperature source Temporal, height 1.635 m (5' 4.37\"), SpO2 98 %. Body mass index is 48.36 kg/m .  Patient Active Problem List   Diagnosis     Depression     CAROLINE (generalized anxiety disorder)     Migraine with aura and without status migrainosus, not intractable     Non morbid obesity due to excess calories     Morbid obesity (H)       Wt Readings from Last 2 Encounters:   12/16/22 129.3 kg (285 lb)   08/08/22 129.7 kg (286 lb)     BP Readings from Last 3 Encounters:   01/13/23 129/82   01/02/23 125/86   12/16/22 136/86         Current Outpatient Medications   Medication     albuterol (PROAIR HFA/PROVENTIL HFA/VENTOLIN HFA) 108 (90 Base) MCG/ACT inhaler     buPROPion (WELLBUTRIN XL) 150 MG 24 hr tablet     buPROPion HCl (WELLBUTRIN PO)     fluticasone (FLONASE) 50 MCG/ACT nasal spray     levonorgestrel (KYLEENA) 19.5 MG IUD     Multiple Vitamin (MULTIVITAMINS PO)     sertraline (ZOLOFT) 50 MG tablet     sodium chloride (OCEAN) 0.65 % nasal spray     doxycycline hyclate (VIBRAMYCIN) 100 MG capsule     No current facility-administered medications for this visit.       Social History     Tobacco Use     Smoking status: Never     Smokeless tobacco: Never   Substance Use Topics     Alcohol use: Yes     Comment: less than 1 drink a week.     Drug use: Yes     Types: Other     Comment: Delta 8 gummie, 5 mg, 4 times a week.       Health Maintenance Due   Topic Date Due     ADVANCE CARE PLANNING  Never done       No results found for: PAP      January 13, 2023 3:45 PM    "

## 2023-01-30 DIAGNOSIS — R05.3 CHRONIC COUGH: ICD-10-CM

## 2023-01-30 DIAGNOSIS — J01.90 ACUTE SINUSITIS WITH SYMPTOMS > 10 DAYS: ICD-10-CM

## 2023-01-30 RX ORDER — ALBUTEROL SULFATE 90 UG/1
AEROSOL, METERED RESPIRATORY (INHALATION)
Qty: 8.5 G | Refills: 1 | Status: SHIPPED | OUTPATIENT
Start: 2023-01-30 | End: 2023-04-24

## 2023-01-30 RX ORDER — FLUTICASONE PROPIONATE 50 MCG
SPRAY, SUSPENSION (ML) NASAL
Qty: 16 G | Refills: 1 | Status: SHIPPED | OUTPATIENT
Start: 2023-01-30 | End: 2023-08-15

## 2023-01-30 NOTE — TELEPHONE ENCOUNTER
Albuterol 108 (90 Base) mcg/ACT inhaler + Fluticasone (Flonase) 50 mcg/ACT nasal spray    Last Office Visit: 1/13/23  Future [facility] Appointments: None  Medication last refilled:     1/2/23 #18 g with 0 refill(s)-Albuterol  1/2/23 #11.1 ml with 0 refill(s)-Flonase    No ACT on file    Prescription approved per [facility] Refill Protocol.    Anh Bryson, JULIAN, RN, CCM

## 2023-03-30 DIAGNOSIS — J40 BRONCHITIS: ICD-10-CM

## 2023-03-30 RX ORDER — FLUTICASONE PROPIONATE AND SALMETEROL XINAFOATE 115; 21 UG/1; UG/1
2 AEROSOL, METERED RESPIRATORY (INHALATION) 2 TIMES DAILY
Qty: 12 G | Refills: 1 | Status: SHIPPED | OUTPATIENT
Start: 2023-03-30 | End: 2023-04-24

## 2023-03-30 NOTE — TELEPHONE ENCOUNTER
Medication requested: fluticasone-salmeterol (ADVAIR HFA) 115-21 MCG/ACT inhaler  Last office visit: 1/13/23  [facility] appointments: none  Medication last refilled: 1/13/23; 12 g + 1 refill  Last qualifying labs: N/A    Prescription approved per [facility] Refill Protocol.    Javed CASTILLO, RN  03/30/23 11:34 AM

## 2023-04-04 DIAGNOSIS — F32.A DEPRESSION, UNSPECIFIED DEPRESSION TYPE: ICD-10-CM

## 2023-04-04 RX ORDER — BUPROPION HYDROCHLORIDE 150 MG/1
TABLET ORAL
Qty: 90 TABLET | Refills: 1 | Status: SHIPPED | OUTPATIENT
Start: 2023-04-04 | End: 2023-04-24

## 2023-04-04 NOTE — TELEPHONE ENCOUNTER
Bupropion (Wellbutrin XL) 150 + Sertraline (Zoloft) 50 mg    Last Office Visit: 1/13/23  [facility] Appointments: None  Medication last refilled:     1/3/23 #90 with 0 refill(s) - Bupropion   1/3/23 #90 with 0 refill(s) - Sertraline     8/8/2022 1/2/2023   PHQ-9 / CAROLINE-7 Scores      CAROLINE-7 Score DocFlow 10  7    PHQ-9 Score DocFlow 15  8      Prescription approved per [facility] Refill Protocol.    Anh Bryson, JULIAN, RN, CCM

## 2023-04-22 NOTE — PROGRESS NOTES
ASSESSMENT AND PLAN:     (R06.00) Dyspnea, unspecified type  (primary encounter diagnosis)  (R06.2) Wheezing  (R05.3) Chronic cough  Comment: Chronic, undiagnosed.    Differential includes recurrent infections, upper airway cough syndrome, GERD, and asthma.    Check x-ray for structural issues.   Get spirometry to evaluate for asthma/obstruction.  Refilled albuterol since she has relief with this.  Start on Flonase (for UACS) and PPI (for GERD) for 1 month. If improves, will stop one and continue the other for a month.     Plan: XR Chest 2 Views, General PFT Lab (Please         always keep checked), Pulmonary Function Test,         albuterol (PROAIR HFA/PROVENTIL HFA/VENTOLIN         HFA) 108 (90 Base) MCG/ACT inhaler          (F32.A) Depression, unspecified depression type  Comment: Chronic, stable.   Plan: buPROPion (WELLBUTRIN XL) 150 MG 24 hr tablet,         sertraline (ZOLOFT) 50 MG tablet          (K21.9) Gastroesophageal reflux disease, unspecified whether esophagitis present  Comment: Chronic, stable. Starting temporary PPI in case cough is GERD related (see above).   Plan: omeprazole (PRILOSEC) 20 MG DR kassandra Quinnoez MD   [facility]  04/30/2023, 4:03 PM      SUBJECTIVE:   Briana is a 27 year old female who presents to clinic today for a return visit.    - had COVID in October and has had ongoing symptoms since then without any periods of feeling well    - seen in urgent care 12/16/22 for rhinorrhea, cough, chest pain with coughing for a few weeks  - was noted to have decreased breath sounds in RLL  - CXR showed mild bibasilar opacities suspicious for pneumonia, WBC mildly elevated at 11.6  - was prescribed Augmentin x10 days    - finished antibiotics but had persistent productive cough and nasal congestion  - saw Dr. Thapa in this clinic 1/2/23 for this and was prescribed doxycycline x7 days and albuterol  - found the albuterol helpful    - saw Dr. Carrillo in this  clinic 1/13/23 for ongoing cough, wheezing, sinus drainage  - she was prescribed Advair   - felt like this made the mucous production worse so stopped after 2 months (stopped 2 weeks ago)  - went back to just using albuterol PRN    - when she breaths she can hear and feel rattling in her chest  - if she coughs she brings up mucous  - worst at the end of the day and right when she first wakes up    - before illness would walk dog 1-2 miles a day  - this winter found this too hard from a breathing standpoint so stopped  - has started easing back into this as weather has warmed up, going okay  - when she goes on longer walks she notices that cough is more productive  - periodically throughout the day if she has recently coughed she will have a whistling sound when breathing out    - has chest tightness and anterior neck tightness but this was an issue before the respiratory symptoms started  - doesn't think it has greatly worsened since these issues started    - uses albuterol 2-3 times a day  - uses if coughing excessively and it seems to calm things down for a bit  - takes albuterol on walks if she is starting to feel short of breath or excessive coughing    - had more sinus symptoms earlier in January  - did Flonase daily for a couple of weeks and drainage/congestion resolved  - recently rhinorrhea, congestion, and postnasal drainage  - restarted Flonase within the past week    - has intermittent heart burn, 2-3 days in the past 2 weeks  - rarely reflux sensation    - she was diagnosed with asthma as a child   - brother had asthma as a child  - mother has inhaler but unsure of the diagnosis  - no personal or family history of food allergies  - no persona or family history of eczema        Patient Active Problem List   Diagnosis     Depression     CAROLINE (generalized anxiety disorder)     Migraine with aura and without status migrainosus, not intractable     Non morbid obesity due to excess calories     Morbid obesity  (H)     Current Outpatient Medications   Medication     albuterol (PROAIR HFA/PROVENTIL HFA/VENTOLIN HFA) 108 (90 Base) MCG/ACT inhaler     buPROPion (WELLBUTRIN XL) 150 MG 24 hr tablet     fluticasone (FLONASE) 50 MCG/ACT nasal spray     levonorgestrel (KYLEENA) 19.5 MG IUD     omeprazole (PRILOSEC) 20 MG DR capsule     sertraline (ZOLOFT) 50 MG tablet     sodium chloride (OCEAN) 0.65 % nasal spray     vitamin C with B complex (B COMPLEX-C) tablet     Multiple Vitamin (MULTIVITAMINS PO)     Vitamin D3 (CHOLECALCIFEROL) 125 MCG (5000 UT) tablet     No current facility-administered medications for this visit.       I have reviewed the patient's relevant past medical history.     OBJECTIVE:   /85 (BP Location: Right arm, Patient Position: Sitting, Cuff Size: Adult Large)   Pulse 86   Temp 97.5  F (36.4  C) (Skin)   Resp 15   LMP  (Exact Date)   SpO2 98%     Constitutional: well-appearing, appears stated age  Eyes: conjunctivae without erythema, sclera anicteric.   Cardiac: regular rate and rhythm, normal S1/S2, no murmur/rubs/gallops  Respiratory: lungs clear to auscultation bilaterally, normal work of breathing, no wheezes/crackles  Skin: no rashes, lesions, or wounds  Psych: affect is full and appropriate, speech is fluent and non-pressured

## 2023-04-24 ENCOUNTER — OFFICE VISIT (OUTPATIENT)
Dept: FAMILY MEDICINE | Facility: CLINIC | Age: 28
End: 2023-04-24
Payer: COMMERCIAL

## 2023-04-24 VITALS
TEMPERATURE: 97.5 F | OXYGEN SATURATION: 98 % | SYSTOLIC BLOOD PRESSURE: 136 MMHG | HEART RATE: 86 BPM | DIASTOLIC BLOOD PRESSURE: 85 MMHG | RESPIRATION RATE: 15 BRPM

## 2023-04-24 DIAGNOSIS — R06.2 WHEEZING: ICD-10-CM

## 2023-04-24 DIAGNOSIS — K21.9 GASTROESOPHAGEAL REFLUX DISEASE, UNSPECIFIED WHETHER ESOPHAGITIS PRESENT: ICD-10-CM

## 2023-04-24 DIAGNOSIS — R06.00 DYSPNEA, UNSPECIFIED TYPE: Primary | ICD-10-CM

## 2023-04-24 DIAGNOSIS — F32.A DEPRESSION, UNSPECIFIED DEPRESSION TYPE: ICD-10-CM

## 2023-04-24 DIAGNOSIS — R05.3 CHRONIC COUGH: ICD-10-CM

## 2023-04-24 RX ORDER — BUPROPION HYDROCHLORIDE 150 MG/1
150 TABLET ORAL EVERY MORNING
Qty: 90 TABLET | Refills: 1 | Status: SHIPPED | OUTPATIENT
Start: 2023-04-24 | End: 2023-10-09

## 2023-04-24 RX ORDER — ALBUTEROL SULFATE 90 UG/1
2 AEROSOL, METERED RESPIRATORY (INHALATION) EVERY 4 HOURS PRN
Qty: 8.5 G | Refills: 1 | Status: SHIPPED | OUTPATIENT
Start: 2023-04-24 | End: 2023-08-15

## 2023-04-24 RX ORDER — MULTIVITAMIN WITH IRON
1 TABLET ORAL DAILY
COMMUNITY
End: 2023-08-15

## 2023-04-24 ASSESSMENT — ANXIETY QUESTIONNAIRES
5. BEING SO RESTLESS THAT IT IS HARD TO SIT STILL: NOT AT ALL
3. WORRYING TOO MUCH ABOUT DIFFERENT THINGS: SEVERAL DAYS
6. BECOMING EASILY ANNOYED OR IRRITABLE: SEVERAL DAYS
GAD7 TOTAL SCORE: 8
2. NOT BEING ABLE TO STOP OR CONTROL WORRYING: SEVERAL DAYS
IF YOU CHECKED OFF ANY PROBLEMS ON THIS QUESTIONNAIRE, HOW DIFFICULT HAVE THESE PROBLEMS MADE IT FOR YOU TO DO YOUR WORK, TAKE CARE OF THINGS AT HOME, OR GET ALONG WITH OTHER PEOPLE: SOMEWHAT DIFFICULT
1. FEELING NERVOUS, ANXIOUS, OR ON EDGE: MORE THAN HALF THE DAYS
GAD7 TOTAL SCORE: 8
7. FEELING AFRAID AS IF SOMETHING AWFUL MIGHT HAPPEN: SEVERAL DAYS

## 2023-04-24 ASSESSMENT — PATIENT HEALTH QUESTIONNAIRE - PHQ9
SUM OF ALL RESPONSES TO PHQ QUESTIONS 1-9: 14
5. POOR APPETITE OR OVEREATING: MORE THAN HALF THE DAYS

## 2023-04-24 ASSESSMENT — ASTHMA QUESTIONNAIRES: ACT_TOTALSCORE: 9

## 2023-04-24 NOTE — PATIENT INSTRUCTIONS
Call to schedule your x-ray  [address] [place] [address]  [phone]      Stay on Flonase for the next month, 1 spray both nostrils twice a day  Start on Omeprazole once a day, 30 minutes before breakfast, for the next month      Go to the [employer] website to set up home delivery for your Sertraline and Wellbutrin

## 2023-04-24 NOTE — NURSING NOTE
27 year old  Chief Complaint   Patient presents with     Asthma     Ongoing cough/asthma symptoms/pneumonia check in, stopped Advair (making it worse)        Blood pressure 136/85, pulse 86, temperature 97.5  F (36.4  C), temperature source Skin, resp. rate 15, SpO2 98 %. There is no height or weight on file to calculate BMI.  Patient Active Problem List   Diagnosis     Depression     CAROLINE (generalized anxiety disorder)     Migraine with aura and without status migrainosus, not intractable     Non morbid obesity due to excess calories     Morbid obesity (H)       Wt Readings from Last 2 Encounters:   12/16/22 129.3 kg (285 lb)   08/08/22 129.7 kg (286 lb)     BP Readings from Last 3 Encounters:   04/24/23 136/85   01/13/23 129/82   01/02/23 125/86         Current Outpatient Medications   Medication     albuterol (PROAIR HFA/PROVENTIL HFA/VENTOLIN HFA) 108 (90 Base) MCG/ACT inhaler     buPROPion (WELLBUTRIN XL) 150 MG 24 hr tablet     fluticasone (FLONASE) 50 MCG/ACT nasal spray     levonorgestrel (KYLEENA) 19.5 MG IUD     sertraline (ZOLOFT) 50 MG tablet     sodium chloride (OCEAN) 0.65 % nasal spray     vitamin C with B complex (B COMPLEX-C) tablet     fluticasone-salmeterol (ADVAIR HFA) 115-21 MCG/ACT inhaler     Multiple Vitamin (MULTIVITAMINS PO)     Vitamin D3 (CHOLECALCIFEROL) 125 MCG (5000 UT) tablet     No current facility-administered medications for this visit.       Social History     Tobacco Use     Smoking status: Never     Smokeless tobacco: Never   Substance Use Topics     Alcohol use: Yes     Comment: less than 1 drink a week.     Drug use: Yes     Types: Other     Comment: Delta 8 gummie, 5 mg, 4 times a week.       Health Maintenance Due   Topic Date Due     ADVANCE CARE PLANNING  Never done       No results found for: PAP      April 24, 2023 2:15 PM

## 2023-08-14 ENCOUNTER — OFFICE VISIT (OUTPATIENT)
Dept: OBGYN | Facility: CLINIC | Age: 28
End: 2023-08-14
Attending: ADVANCED PRACTICE MIDWIFE
Payer: COMMERCIAL

## 2023-08-14 VITALS
HEART RATE: 84 BPM | DIASTOLIC BLOOD PRESSURE: 81 MMHG | SYSTOLIC BLOOD PRESSURE: 117 MMHG | HEIGHT: 64 IN | BODY MASS INDEX: 48.66 KG/M2

## 2023-08-14 DIAGNOSIS — Z30.432 ENCOUNTER FOR REMOVAL OF INTRAUTERINE CONTRACEPTIVE DEVICE: Primary | ICD-10-CM

## 2023-08-14 PROCEDURE — 58301 REMOVE INTRAUTERINE DEVICE: CPT | Performed by: ADVANCED PRACTICE MIDWIFE

## 2023-08-14 ASSESSMENT — PAIN SCALES - GENERAL: PAINLEVEL: NO PAIN (0)

## 2023-08-14 NOTE — PROGRESS NOTES
IUD Removal:  SUBJECTIVE:    Is a pregnancy test required: No.  Was a consent obtained?  Yes    Briana Gordon is a 27 year old female,No obstetric history on file., Patient's last menstrual period was 08/10/2023 (exact date). who presents today for IUD removal. Her current IUD was placed 5 years ago. She  has had some irregular bleeding  with the IUD. She requests removal of the IUD because  it is currently .  Does not want to have another IUD inserted.  Would like to stop hormonal contraception.  Plans to use condoms.  Reports that she and her partner are considering pregnancy, but not at this time.    Today's PHQ-2 Score:       2023     2:56 PM   PHQ-2 (  [employer])   Q1: Little interest or pleasure in doing things 2   Q2: Feeling down, depressed or hopeless 2   PHQ-2 Score 4       PROCEDURE:    A speculum exam was performed and the cervix was visualized. The IUD string was visualized. Using ring forceps, the string  was grasped and the IUD removed intact.    POST PROCEDURE:    The patient tolerated the procedure well. Patient was discharged in stable condition.    Birth control counseling given., Pregnancy counseling given, including folic acid supplementation 800-1000 mg per day., and patient had good success with [facility] OCP in the past, she is aware that she may call if she desires to re-start the OCP.    PONCE Pyle CNM

## 2023-08-14 NOTE — LETTER
2023       RE: Briana Gordon  [address]  [address]  [place] [address]     Dear Colleague,    Thank you for referring your patient, Briana Gordon, to the [facility] [place] at [facility]. Please see a copy of my visit note below.    IUD Removal:  SUBJECTIVE:    Is a pregnancy test required: No.  Was a consent obtained?  Yes    Briana Gordon is a 27 year old female,No obstetric history on file., Patient's last menstrual period was 08/10/2023 (exact date). who presents today for IUD removal. Her current IUD was placed 5 years ago. She  has had some irregular bleeding  with the IUD. She requests removal of the IUD because  it is currently .  Does not want to have another IUD inserted.  Would like to stop hormonal contraception.  Plans to use condoms.  Reports that she and her partner are considering pregnancy, but not at this time.    Today's PHQ-2 Score:       2023     2:56 PM   PHQ-2 (  [employer])   Q1: Little interest or pleasure in doing things 2   Q2: Feeling down, depressed or hopeless 2   PHQ-2 Score 4       PROCEDURE:    A speculum exam was performed and the cervix was visualized. The IUD string was visualized. Using ring forceps, the string  was grasped and the IUD removed intact.    POST PROCEDURE:    The patient tolerated the procedure well. Patient was discharged in stable condition.    Birth control counseling given., Pregnancy counseling given, including folic acid supplementation 800-1000 mg per day., and patient had good success with [facility] OCP in the past, she is aware that she may call if she desires to re-start the OCP.    PONCE Pyle CNM

## 2023-08-14 NOTE — NURSING NOTE
Iud removal   and irregular periods ,  pain with sex - tender cervix  especially in first few weeks, cramping.

## 2023-08-15 ENCOUNTER — OFFICE VISIT (OUTPATIENT)
Dept: FAMILY MEDICINE | Facility: CLINIC | Age: 28
End: 2023-08-15
Payer: COMMERCIAL

## 2023-08-15 VITALS
TEMPERATURE: 98.1 F | BODY MASS INDEX: 48.65 KG/M2 | HEIGHT: 64 IN | DIASTOLIC BLOOD PRESSURE: 81 MMHG | HEART RATE: 86 BPM | RESPIRATION RATE: 15 BRPM | WEIGHT: 285 LBS | OXYGEN SATURATION: 96 % | SYSTOLIC BLOOD PRESSURE: 121 MMHG

## 2023-08-15 DIAGNOSIS — F41.1 GAD (GENERALIZED ANXIETY DISORDER): ICD-10-CM

## 2023-08-15 DIAGNOSIS — Z13.220 SCREENING FOR LIPID DISORDERS: ICD-10-CM

## 2023-08-15 DIAGNOSIS — R73.01 ELEVATED FASTING GLUCOSE: ICD-10-CM

## 2023-08-15 DIAGNOSIS — Z13.1 SCREENING FOR DIABETES MELLITUS: ICD-10-CM

## 2023-08-15 DIAGNOSIS — R10.2 SUPRAPUBIC PAIN: ICD-10-CM

## 2023-08-15 DIAGNOSIS — Z00.00 ROUTINE GENERAL MEDICAL EXAMINATION AT A HEALTH CARE FACILITY: Primary | ICD-10-CM

## 2023-08-15 LAB
ANION GAP SERPL CALCULATED.3IONS-SCNC: 12 MMOL/L (ref 7–15)
BUN SERPL-MCNC: 11.2 MG/DL (ref 6–20)
CALCIUM SERPL-MCNC: 9.4 MG/DL (ref 8.6–10)
CHLORIDE SERPL-SCNC: 104 MMOL/L (ref 98–107)
CHOLEST SERPL-MCNC: 214 MG/DL
CREAT SERPL-MCNC: 0.62 MG/DL (ref 0.51–0.95)
DEPRECATED HCO3 PLAS-SCNC: 22 MMOL/L (ref 22–29)
GFR SERPL CREATININE-BSD FRML MDRD: >90 ML/MIN/1.73M2
GLUCOSE SERPL-MCNC: 120 MG/DL (ref 70–99)
HBA1C MFR BLD: 7.2 %
HDLC SERPL-MCNC: 41 MG/DL
LDLC SERPL CALC-MCNC: 123 MG/DL
NONHDLC SERPL-MCNC: 173 MG/DL
POTASSIUM SERPL-SCNC: 3.8 MMOL/L (ref 3.4–5.3)
SODIUM SERPL-SCNC: 138 MMOL/L (ref 136–145)
TRIGL SERPL-MCNC: 249 MG/DL

## 2023-08-15 PROCEDURE — 83036 HEMOGLOBIN GLYCOSYLATED A1C: CPT | Mod: ORL | Performed by: FAMILY MEDICINE

## 2023-08-15 PROCEDURE — 80048 BASIC METABOLIC PNL TOTAL CA: CPT | Mod: ORL | Performed by: FAMILY MEDICINE

## 2023-08-15 PROCEDURE — 80061 LIPID PANEL: CPT | Mod: ORL | Performed by: FAMILY MEDICINE

## 2023-08-15 ASSESSMENT — ANXIETY QUESTIONNAIRES
IF YOU CHECKED OFF ANY PROBLEMS ON THIS QUESTIONNAIRE, HOW DIFFICULT HAVE THESE PROBLEMS MADE IT FOR YOU TO DO YOUR WORK, TAKE CARE OF THINGS AT HOME, OR GET ALONG WITH OTHER PEOPLE: SOMEWHAT DIFFICULT
5. BEING SO RESTLESS THAT IT IS HARD TO SIT STILL: NOT AT ALL
GAD7 TOTAL SCORE: 9
7. FEELING AFRAID AS IF SOMETHING AWFUL MIGHT HAPPEN: SEVERAL DAYS
3. WORRYING TOO MUCH ABOUT DIFFERENT THINGS: MORE THAN HALF THE DAYS
6. BECOMING EASILY ANNOYED OR IRRITABLE: SEVERAL DAYS
2. NOT BEING ABLE TO STOP OR CONTROL WORRYING: MORE THAN HALF THE DAYS
1. FEELING NERVOUS, ANXIOUS, OR ON EDGE: SEVERAL DAYS
GAD7 TOTAL SCORE: 9

## 2023-08-15 ASSESSMENT — PATIENT HEALTH QUESTIONNAIRE - PHQ9
5. POOR APPETITE OR OVEREATING: MORE THAN HALF THE DAYS
SUM OF ALL RESPONSES TO PHQ QUESTIONS 1-9: 10

## 2023-08-15 NOTE — PROGRESS NOTES
SUBJECTIVE:   CC: Briana is an 27 year old who presents for preventive health visit.       HPI  # Health Maintenance  - BP:   BP Readings from Last 3 Encounters:   08/14/23 117/81   04/24/23 136/85   01/13/23 129/82   - Cholesterol: pending  Recent Labs   Lab Test 01/09/23  0846   CHOL 210*   HDL 48*   *   TRIG 228*   The ASCVD Risk score (Evan MCGOWAN, et al., 2019) failed to calculate for the following reasons:    The 2019 ASCVD risk score is only valid for ages 40 to 79  - Diabetes Screening: pending  - Lung Cancer Screening: not indicated  55-79yo w/30py smoking history and currently smoking OR quit within past 15 years:  Low dose CT annually and discontinued once a person has been 15 years tobacco free  - (+) seatbelt use, (+) helmet, (+) smoke detector  - Feels safe at home, denies verbal/physical/emotional abuse in past year: yes  - Last Pap: 8/8/22 NILM, plan for repeat in 2025    PROBLEMS TO ADD ON...  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 (For example, a Health Directive, POLST, or a discussion with a medical provider or your loved ones about your wishes): No, advance care planning information given to patient to review.  Patient plans to discuss their wishes with loved ones or provider.      Social History     Tobacco Use    Smoking status: Never    Smokeless tobacco: Never   Substance Use Topics    Alcohol use: Yes      Comment: less than 1 drink a week.         8/8/2022     9:30 AM   Alcohol Use   Prescreen: >3 drinks/day or >7 drinks/week? No     Reviewed orders with patient.  Reviewed health maintenance and updated orders accordingly - Yes    Breast Cancer Screening:  Any new diagnosis of family breast, ovarian, or bowel cancer? No    Patient under 40 years of age: Routine Mammogram Screening not recommended.   Pertinent mammograms are reviewed under the imaging tab.    History of abnormal Pap smear: NO - age 21-29 PAP every 3 years recommended      8/8/2022    11:28 AM   PAP / HPV   PAP Negative for Intraepithelial Lesion or Malignancy (NILM)      Reviewed and updated as needed this visit by clinical staff   Tobacco  Allergies  Meds  Problems  Med Hx  Surg Hx  Fam Hx          Reviewed and updated as needed this visit by Provider   Tobacco  Allergies  Meds  Problems  Med Hx  Surg Hx  Fam Hx         Past Medical History:   Diagnosis Date    Depression     Generalized anxiety disorder       Past Surgical History:   Procedure Laterality Date    WISDOM TOOTH EXTRACTION      2019       Review of Systems  CONSTITUTIONAL: NEGATIVE for fever, chills, change in weight  INTEGUMENTARU/SKIN: NEGATIVE for worrisome rashes, moles or lesions  EYES: NEGATIVE for vision changes or irritation  ENT: NEGATIVE for ear, mouth and throat problems  RESP: NEGATIVE for significant cough or SOB  BREAST: NEGATIVE for masses, tenderness or discharge  CV: NEGATIVE for chest pain, palpitations or peripheral edema  GI: NEGATIVE for nausea, abdominal pain, heartburn, or change in bowel habits  : NEGATIVE for unusual urinary or vaginal symptoms. Irregular periods with suspected ovarian pain as noted above  MUSCULOSKELETAL: NEGATIVE for significant arthralgias or myalgia  NEURO: NEGATIVE for weakness, dizziness or paresthesias  PSYCHIATRIC: NEGATIVE for changes in mood or affect     OBJECTIVE:   /81 (BP Location: Right arm, Patient Position:  "Sitting, Cuff Size: Adult Large)   Pulse 86   Temp 98.1  F (36.7  C) (Skin)   Resp 15   Ht 1.63 m (5' 4.17\")   Wt 129.3 kg (285 lb)   LMP  (LMP Unknown)   SpO2 96%   BMI 48.66 kg/m    Physical Exam  GENERAL: alert and no distress  NECK: no adenopathy, no asymmetry, masses, or scars and thyroid normal to palpation  RESP: lungs clear to auscultation - no rales, rhonchi or wheezes  CV: regular rate and rhythm, normal S1 S2, no S3 or S4, no murmur, click or rub, no peripheral edema and peripheral pulses strong  ABDOMEN: soft, nontender, no hepatosplenomegaly, no masses and bowel sounds normal  MS: no gross musculoskeletal defects noted, no edema    Diagnostic Test Results:  Labs reviewed in Epic    ASSESSMENT/PLAN:   Briana was seen today for physical.    Diagnoses and all orders for this visit:    Routine general medical examination at a health care facility    Screening for lipid disorders  -     Lipid Panel; Future  -     Lipid Panel    Elevated fasting glucose  -     Basic Metabolic Panel; Future  -     Basic Metabolic Panel    Screening for diabetes mellitus  -     Hemoglobin A1c; Future  -     Hemoglobin A1c    Suprapubic pain    CAROLINE (generalized anxiety disorder)        Patient has been advised of split billing requirements and indicates understanding: Yes      COUNSELING:  Reviewed preventive health counseling, as reflected in patient instructions        She reports that she has never smoked. She has never used smokeless tobacco.          Chris Carrillo MD  [facility]  "

## 2023-09-18 ENCOUNTER — ALLIED HEALTH/NURSE VISIT (OUTPATIENT)
Dept: FAMILY MEDICINE | Facility: CLINIC | Age: 28
End: 2023-09-18
Payer: COMMERCIAL

## 2023-09-18 DIAGNOSIS — Z23 ENCOUNTER FOR IMMUNIZATION: Primary | ICD-10-CM

## 2023-09-18 PROCEDURE — 90686 IIV4 VACC NO PRSV 0.5 ML IM: CPT

## 2023-09-18 PROCEDURE — 99207 PR NO CHARGE NURSE ONLY: CPT

## 2023-09-18 PROCEDURE — 90471 IMMUNIZATION ADMIN: CPT

## 2023-09-18 NOTE — PROGRESS NOTES
Prior to immunization administration, verified patients identity using patient s name and date of birth. Please see Immunization Activity for additional information.     Screening Questionnaire for Adult Immunization    Are you sick today?   No   Do you have allergies to medications, food, a vaccine component or latex?   No   Have you ever had a serious reaction after receiving a vaccination?   No   Do you have a long-term health problem with heart, lung, kidney, or metabolic disease (e.g., diabetes), asthma, a blood disorder, no spleen, complement component deficiency, a cochlear implant, or a spinal fluid leak?  Are you on long-term aspirin therapy?   No   Do you have cancer, leukemia, HIV/AIDS, or any other immune system problem?   No   Do you have a parent, brother, or sister with an immune system problem?   No   In the past 3 months, have you taken medications that affect  your immune system, such as prednisone, other steroids, or anticancer drugs; drugs for the treatment of rheumatoid arthritis, Crohn s disease, or psoriasis; or have you had radiation treatments?   No   Have you had a seizure, or a brain or other nervous system problem?   No   During the past year, have you received a transfusion of blood or blood    products, or been given immune (gamma) globulin or antiviral drug?   No   For women: Are you pregnant or is there a chance you could become       pregnant during the next month?   No   Have you received any vaccinations in the past 4 weeks?   No     Immunization questionnaire answers were all negative.    I have reviewed the following standing orders:   This patient is due and qualifies for the Influenza vaccine.    Click here for Influenza Vaccine Standing Order    I have reviewed the vaccines inclusion and exclusion criteria; No concerns regarding eligibility. .    Patient instructed to remain in clinic for 15 minutes afterwards, and to report any adverse reactions.     Screening performed by  Jill Huertas, CMA on 9/18/2023 at 2:43 PM.

## 2023-09-29 ENCOUNTER — OFFICE VISIT (OUTPATIENT)
Dept: FAMILY MEDICINE | Facility: CLINIC | Age: 28
End: 2023-09-29
Payer: COMMERCIAL

## 2023-09-29 VITALS
HEART RATE: 78 BPM | OXYGEN SATURATION: 97 % | RESPIRATION RATE: 15 BRPM | DIASTOLIC BLOOD PRESSURE: 75 MMHG | TEMPERATURE: 97.2 F | SYSTOLIC BLOOD PRESSURE: 139 MMHG

## 2023-09-29 DIAGNOSIS — E11.9 TYPE 2 DIABETES MELLITUS WITHOUT COMPLICATION, WITHOUT LONG-TERM CURRENT USE OF INSULIN (H): Primary | ICD-10-CM

## 2023-09-29 RX ORDER — METFORMIN HCL 500 MG
TABLET, EXTENDED RELEASE 24 HR ORAL
Qty: 194 TABLET | Refills: 0 | Status: SHIPPED | OUTPATIENT
Start: 2023-09-29 | End: 2023-11-22

## 2023-09-29 ASSESSMENT — ANXIETY QUESTIONNAIRES
5. BEING SO RESTLESS THAT IT IS HARD TO SIT STILL: SEVERAL DAYS
GAD7 TOTAL SCORE: 5
7. FEELING AFRAID AS IF SOMETHING AWFUL MIGHT HAPPEN: NOT AT ALL
3. WORRYING TOO MUCH ABOUT DIFFERENT THINGS: SEVERAL DAYS
6. BECOMING EASILY ANNOYED OR IRRITABLE: SEVERAL DAYS
GAD7 TOTAL SCORE: 5
1. FEELING NERVOUS, ANXIOUS, OR ON EDGE: SEVERAL DAYS
2. NOT BEING ABLE TO STOP OR CONTROL WORRYING: NOT AT ALL

## 2023-09-29 ASSESSMENT — PATIENT HEALTH QUESTIONNAIRE - PHQ9
SUM OF ALL RESPONSES TO PHQ QUESTIONS 1-9: 3
5. POOR APPETITE OR OVEREATING: SEVERAL DAYS

## 2023-09-29 NOTE — PROGRESS NOTES
"  Assessment & Plan   Problem List Items Addressed This Visit          Endocrine    Diabetes mellitus, type 2 (H) - Primary    Relevant Medications    metFORMIN (GLUCOPHAGE XR) 500 MG 24 hr tablet      Will start medication at noted above and monitor for safety and efficacy. Encouraged her to continue working with the [facility]. We did discuss the Weight Management Program but we also spent some time discussing risks and benefits of GLP-1 medications. Depending on how Briana feels the metformin and the [facility] program work for her I am not opposed to starting an GLP-1RA.    36 minutes spent on the date of the encounter doing chart review, history and exam, documentation and further activities as noted.      Chris Carrillo MD  [facility]    Subjective   Briana is a 27 year old, presenting for the following health issues:  RECHECK (Follow up on A1c results. Weight and Height declined. )      HPI   \"A1c results\"  New diagnosis of DM2, history of obesity  - last A1c on 8/15/23: 7.2  - no previous diagnosis of diabetes  - Briana has a significant traumatic history with food  - she has started working with the [facility] with a new diagnosis of Binge Eating Disorder  - she does have some curiosity about GLP-1 RA medications   - previously referred to the weight management clinic with [facility], it took her awhile to build up the courage to call over there and they then advised her to call her insurance to make sure of coverage which she has not followed through with yet    Review of Systems   Constitutional, HEENT, cardiovascular, pulmonary, gi and gu systems are negative, except as otherwise noted.      Objective    /75 (BP Location: Right arm, Patient Position: Sitting, Cuff Size: Adult Regular)   Pulse 78   Temp 97.2  F (36.2  C) (Skin)   Resp 15   LMP 09/06/2023 (Exact Date)   SpO2 97%   There is no height or weight on file to calculate BMI.  Physical Exam   GENERAL: healthy, alert " and no distress  NECK: no adenopathy, no asymmetry, masses, or scars and thyroid normal to palpation  RESP: lungs clear to auscultation - no rales, rhonchi or wheezes  CV: regular rate and rhythm, normal S1 S2, no S3 or S4, no murmur, click or rub, no peripheral edema and peripheral pulses strong  ABDOMEN: soft, nontender, no hepatosplenomegaly, no masses and bowel sounds normal  MS: no gross musculoskeletal defects noted, no edema

## 2023-09-29 NOTE — NURSING NOTE
27 year old  Chief Complaint   Patient presents with    RECHECK     Follow up on A1c results. Weight and Height declined.        Blood pressure 139/75, pulse 78, temperature 97.2  F (36.2  C), temperature source Skin, resp. rate 15, last menstrual period 09/06/2023, SpO2 97 %. There is no height or weight on file to calculate BMI.  Patient Active Problem List   Diagnosis    Depression    CAROLINE (generalized anxiety disorder)    Migraine with aura and without status migrainosus, not intractable    Non morbid obesity due to excess calories    Morbid obesity (H)       Wt Readings from Last 2 Encounters:   08/15/23 129.3 kg (285 lb)   12/16/22 129.3 kg (285 lb)     BP Readings from Last 3 Encounters:   09/29/23 139/75   08/15/23 121/81   08/14/23 117/81         Current Outpatient Medications   Medication    buPROPion (WELLBUTRIN XL) 150 MG 24 hr tablet    omeprazole (PRILOSEC) 20 MG DR capsule    sertraline (ZOLOFT) 50 MG tablet     No current facility-administered medications for this visit.       Social History     Tobacco Use    Smoking status: Never    Smokeless tobacco: Never   Vaping Use    Vaping Use: Never used   Substance Use Topics    Alcohol use: Yes     Comment: less than 1 drink a week.    Drug use: Yes     Types: Other     Comment: Delta 8 gummie, 5 mg, 4 times a week.       Health Maintenance Due   Topic Date Due    COVID-19 Vaccine (5 - 2023-24 season) 09/01/2023       No results found for: PAP      September 29, 2023 10:18 AM

## 2023-10-07 DIAGNOSIS — F32.A DEPRESSION, UNSPECIFIED DEPRESSION TYPE: ICD-10-CM

## 2023-10-09 RX ORDER — BUPROPION HYDROCHLORIDE 150 MG/1
150 TABLET ORAL EVERY MORNING
Qty: 90 TABLET | Refills: 1 | Status: SHIPPED | OUTPATIENT
Start: 2023-10-09 | End: 2023-11-08

## 2023-10-09 NOTE — TELEPHONE ENCOUNTER
Medication requested: buPROPion (WELLBUTRIN XL) 150 MG 24 hr tablet   Last office visit: 9/29/23  [facility] appointments: none  Medication last refilled: 4/24/23; 90 + 1 refill  Last qualifying labs:    9/29/2023  10:13 AM   PHQ-9 / CAROLINE-7 Scores 8/2015 to present    CAROLINE-7 Score DocFlow 5       9/29/2023  10:13 AM   PHQ-9 / CAROLINE-7 Scores 8/2015 to present    PHQ-9 Score DocFlow 3      Medication requested: sertraline (ZOLOFT) 50 MG tablet   Medication last refilled: 4/24/23; 90 + 1 refill  Last qualifying labs: See above    Prescription approved per [facility] Refill Protocol.    Javed CASTILLO, RN  10/09/23 9:26 AM

## 2023-11-08 DIAGNOSIS — F32.A DEPRESSION, UNSPECIFIED DEPRESSION TYPE: ICD-10-CM

## 2023-11-09 RX ORDER — BUPROPION HYDROCHLORIDE 150 MG/1
150 TABLET ORAL EVERY MORNING
Qty: 90 TABLET | Refills: 1 | Status: SHIPPED | OUTPATIENT
Start: 2023-11-09

## 2023-11-22 ENCOUNTER — MYC REFILL (OUTPATIENT)
Dept: FAMILY MEDICINE | Facility: CLINIC | Age: 28
End: 2023-11-22

## 2023-11-22 DIAGNOSIS — E11.9 TYPE 2 DIABETES MELLITUS WITHOUT COMPLICATION, WITHOUT LONG-TERM CURRENT USE OF INSULIN (H): ICD-10-CM

## 2023-11-24 RX ORDER — METFORMIN HCL 500 MG
1000 TABLET, EXTENDED RELEASE 24 HR ORAL
Qty: 60 TABLET | Refills: 0 | Status: SHIPPED | OUTPATIENT
Start: 2023-11-24 | End: 2024-01-16

## 2023-11-24 NOTE — TELEPHONE ENCOUNTER
Medication requested: metFORMIN (GLUCOPHAGE XR) 500 MG 24 hr tablet   Last office visit: 9/29/23  [facility] appointments: none  Medication last refilled: 9/29/23; 194 + 0 refills  Last qualifying labs:   Component      Latest Ref Rng 8/15/2023  2:49 PM   Hemoglobin A1C      <5.7 % 7.2 (H)      Component      Latest Ref Rng 8/15/2023  2:49 PM   Creatinine      0.51 - 0.95 mg/dL 0.62      Medication is being filled for 1 time refill only due to:  Patient needs labs HbA1c. Patient needs to be seen because du for diabetes follow-up appt after starting metformin.    Message sent to pt to schedule follow-up appt with Dr. Quinonez.    Javed CASTILLO, RN  11/24/23 10:38 AM

## 2023-12-06 ENCOUNTER — IMMUNIZATION (OUTPATIENT)
Dept: FAMILY MEDICINE | Facility: CLINIC | Age: 28
End: 2023-12-06
Payer: COMMERCIAL

## 2023-12-06 DIAGNOSIS — Z23 ENCOUNTER FOR IMMUNIZATION: Primary | ICD-10-CM

## 2023-12-06 PROCEDURE — 91320 SARSCV2 VAC 30MCG TRS-SUC IM: CPT

## 2023-12-06 PROCEDURE — 99207 PR NO CHARGE NURSE ONLY: CPT

## 2023-12-06 PROCEDURE — 90480 ADMN SARSCOV2 VAC 1/ONLY CMP: CPT

## 2023-12-06 NOTE — PROGRESS NOTES
Prior to immunization administration, verified patients identity using patient s name and date of birth. Please see Immunization Activity for additional information.     Screening Questionnaire for Adult Immunization    Are you sick today?   No   Do you have allergies to medications, food, a vaccine component or latex?   No   Have you ever had a serious reaction after receiving a vaccination?   Yes   Do you have a long-term health problem with heart, lung, kidney, or metabolic disease (e.g., diabetes), asthma, a blood disorder, no spleen, complement component deficiency, a cochlear implant, or a spinal fluid leak?  Are you on long-term aspirin therapy?   No   Do you have cancer, leukemia, HIV/AIDS, or any other immune system problem?   No   Do you have a parent, brother, or sister with an immune system problem?   No   In the past 3 months, have you taken medications that affect  your immune system, such as prednisone, other steroids, or anticancer drugs; drugs for the treatment of rheumatoid arthritis, Crohn s disease, or psoriasis; or have you had radiation treatments?   No   Have you had a seizure, or a brain or other nervous system problem?   No   During the past year, have you received a transfusion of blood or blood    products, or been given immune (gamma) globulin or antiviral drug?   No   For women: Are you pregnant or is there a chance you could become       pregnant during the next month?   No   Have you received any vaccinations in the past 4 weeks?   No     Immunization questionnaire was positive for at least one answer.  Notified Dr Mccarthy.    I have reviewed the following standing orders:   This patient is due and qualifies for the Covid-19 vaccine.     Click here for COVID-19 Standing Order    I have reviewed the vaccines inclusion and exclusion criteria; There were concerns regarding the following exclusions, Diabetes. I have brought them to a provider who approved vaccination.    Patient instructed  to remain in clinic for 15 minutes afterwards, and to report any adverse reactions.     Screening performed by Claritza Muniz on 12/6/2023 at 1:31 PM.

## 2023-12-31 ENCOUNTER — HEALTH MAINTENANCE LETTER (OUTPATIENT)
Age: 28
End: 2023-12-31

## 2024-01-16 ENCOUNTER — OFFICE VISIT (OUTPATIENT)
Dept: FAMILY MEDICINE | Facility: CLINIC | Age: 29
End: 2024-01-16
Payer: COMMERCIAL

## 2024-01-16 VITALS
OXYGEN SATURATION: 97 % | HEIGHT: 64 IN | HEART RATE: 76 BPM | SYSTOLIC BLOOD PRESSURE: 135 MMHG | DIASTOLIC BLOOD PRESSURE: 85 MMHG | TEMPERATURE: 97.6 F | BODY MASS INDEX: 50.02 KG/M2 | WEIGHT: 293 LBS

## 2024-01-16 DIAGNOSIS — E11.9 TYPE 2 DIABETES MELLITUS WITHOUT COMPLICATION, WITHOUT LONG-TERM CURRENT USE OF INSULIN (H): Primary | ICD-10-CM

## 2024-01-16 DIAGNOSIS — E66.01 MORBID OBESITY (H): ICD-10-CM

## 2024-01-16 LAB
CREAT UR-MCNC: 104 MG/DL
HBA1C MFR BLD: 6.3 % (ref 0–5.6)
MICROALBUMIN UR-MCNC: <12 MG/L
MICROALBUMIN/CREAT UR: NORMAL MG/G{CREAT}

## 2024-01-16 RX ORDER — METFORMIN HCL 500 MG
1000 TABLET, EXTENDED RELEASE 24 HR ORAL
Qty: 180 TABLET | Refills: 3 | Status: SHIPPED | OUTPATIENT
Start: 2024-01-16

## 2024-01-16 RX ORDER — LISDEXAMFETAMINE DIMESYLATE 20 MG/1
20 CAPSULE ORAL DAILY
COMMUNITY
Start: 2024-01-05 | End: 2024-05-20

## 2024-01-16 NOTE — NURSING NOTE
"28 year old  Chief Complaint   Patient presents with    Medication Request     Metformin refill and discussion.        Blood pressure 135/85, pulse 76, temperature 97.6  F (36.4  C), temperature source Skin, height 1.63 m (5' 4.17\"), weight 139.7 kg (308 lb), last menstrual period 01/14/2024, SpO2 97%. Body mass index is 52.58 kg/m .  Patient Active Problem List   Diagnosis    Depression    CAROLINE (generalized anxiety disorder)    Migraine with aura and without status migrainosus, not intractable    Non morbid obesity due to excess calories    Morbid obesity (H)    Diabetes mellitus, type 2 (H)       Wt Readings from Last 2 Encounters:   01/16/24 139.7 kg (308 lb)   08/15/23 129.3 kg (285 lb)     BP Readings from Last 3 Encounters:   01/16/24 135/85   09/29/23 139/75   08/15/23 121/81         Current Outpatient Medications   Medication    buPROPion (WELLBUTRIN XL) 150 MG 24 hr tablet    lisdexamfetamine (VYVANSE) 20 MG capsule    metFORMIN (GLUCOPHAGE XR) 500 MG 24 hr tablet    omeprazole (PRILOSEC) 20 MG DR capsule    sertraline (ZOLOFT) 50 MG tablet     No current facility-administered medications for this visit.       Social History     Tobacco Use    Smoking status: Never    Smokeless tobacco: Never   Vaping Use    Vaping Use: Never used   Substance Use Topics    Alcohol use: Yes     Comment: less than 1 drink a week.    Drug use: Yes     Types: Other     Comment: Delta 8 gummie, 5 mg, 4 times a week.       Health Maintenance Due   Topic Date Due    MICROALBUMIN  Never done    DIABETIC FOOT EXAM  Never done    EYE EXAM  Never done    IPV IMMUNIZATION (3 of 3 - 4-dose series) 11/26/1997    Pneumococcal Vaccine: Pediatrics (0 to 5 Years) and At-Risk Patients (6 to 64 Years) (1 of 2 - PCV) Never done    A1C  11/15/2023       No results found for: \"PAP\"      January 16, 2024 1:53 PM   "

## 2024-01-16 NOTE — PROGRESS NOTES
"  Assessment & Plan   Problem List Items Addressed This Visit          Digestive    Morbid obesity (H)    Relevant Medications    lisdexamfetamine (VYVANSE) 20 MG capsule    metFORMIN (GLUCOPHAGE XR) 500 MG 24 hr tablet    Other Relevant Orders    Adult Comprehensive Weight Management  Referral       Endocrine    Diabetes mellitus, type 2 (H) - Primary    Relevant Medications    metFORMIN (GLUCOPHAGE XR) 500 MG 24 hr tablet    Other Relevant Orders    Albumin Random Urine Quantitative with Creat Ratio    Hemoglobin A1c (Completed)      Metformin refilled today. Given that Briana has been without this medication for the past 10 days I suspect results to be skewed today, but will check to see. Encouraged her to return to clinic for repeat labs once she has been consistent with medication for three months.     Repeat referral to Weight Management Clinic as noted.     26 minutes spent on the date of the encounter doing chart review, history and exam, documentation and further activities as noted.      MD CR[facility]    Subjective   Briana is a 28 year old, presenting for the following health issues:  Medication Request (Metformin refill and discussion. )    HPI   # Type 2 Diabetes Mellitus  Lab Results   Component Value Date    A1C 7.2 08/15/2023     Lab Results   Component Value Date    CR 0.62 08/15/2023   No results found for: \"UMALSP\", \"UMALCR\", \"UCRR\"  - Current Regimen: metformin 2g  - Missed doses: she has been out of the metformin for the past 10 days  - Self monitoring blood gluose?: no  - Hypoglycemic episodes?: no    Wt Readings from Last 5 Encounters:   01/16/24 139.7 kg (308 lb)   08/15/23 129.3 kg (285 lb)   12/16/22 129.3 kg (285 lb)   08/08/22 129.7 kg (286 lb)       Recent Labs   Lab Test 08/15/23  1449 01/09/23  0846   CHOL 214* 210*   HDL 41* 48*   * 116*   TRIG 249* 228*   - History of ASCVD?: no  - On ASA and statin?: no    Typical Goal: A1c <7.0%, " "fasting glucose , postprandial glucose <180  Consider checking CBC or B12 level  If h/o ASCVD, rx SGLT2i (empagliflozin, canagliflozin) or GLP1 RA (liraglutide, semaglutide, exenatide ER)  If h/o CKD, rx SGLT2i (empagliflozin, canagliflozin).     # Morbid Obesity  - previously referred to Weight Management Clinic by PCP  - wasn't able to start with that before the referral , but now she has time and would like to meet with someone  - not interested in surgical treatment, but would consider medical management in addition to counseling, diet and exercise        Review of Systems   Constitutional, HEENT, cardiovascular, pulmonary, gi and gu systems are negative, except as otherwise noted.      Objective    /85 (BP Location: Right arm, Patient Position: Sitting, Cuff Size: Adult Large)   Pulse 76   Temp 97.6  F (36.4  C) (Skin)   Ht 1.63 m (5' 4.17\")   Wt 139.7 kg (308 lb)   LMP 2024 (Exact Date)   SpO2 97%   BMI 52.58 kg/m    Body mass index is 52.58 kg/m .  Physical Exam   GENERAL: alert and no distress  NECK: no adenopathy, no asymmetry, masses, or scars and thyroid normal to palpation  RESP: lungs clear to auscultation - no rales, rhonchi or wheezes  CV: regular rate and rhythm, normal S1 S2, no S3 or S4, no murmur, click or rub, no peripheral edema and peripheral pulses strong  MS: no gross musculoskeletal defects noted, no edema                  "

## 2024-02-24 ASSESSMENT — SLEEP AND FATIGUE QUESTIONNAIRES
HOW LIKELY ARE YOU TO NOD OFF OR FALL ASLEEP WHILE SITTING INACTIVE IN A PUBLIC PLACE: WOULD NEVER DOZE
HOW LIKELY ARE YOU TO NOD OFF OR FALL ASLEEP WHEN YOU ARE A PASSENGER IN A CAR FOR AN HOUR WITHOUT A BREAK: SLIGHT CHANCE OF DOZING
HOW LIKELY ARE YOU TO NOD OFF OR FALL ASLEEP WHILE SITTING QUIETLY AFTER LUNCH WITHOUT ALCOHOL: WOULD NEVER DOZE
HOW LIKELY ARE YOU TO NOD OFF OR FALL ASLEEP WHILE SITTING AND READING: WOULD NEVER DOZE
HOW LIKELY ARE YOU TO NOD OFF OR FALL ASLEEP WHILE LYING DOWN TO REST IN THE AFTERNOON WHEN CIRCUMSTANCES PERMIT: HIGH CHANCE OF DOZING
HOW LIKELY ARE YOU TO NOD OFF OR FALL ASLEEP WHILE SITTING AND TALKING TO SOMEONE: WOULD NEVER DOZE
HOW LIKELY ARE YOU TO NOD OFF OR FALL ASLEEP IN A CAR, WHILE STOPPED FOR A FEW MINUTES IN TRAFFIC: WOULD NEVER DOZE
HOW LIKELY ARE YOU TO NOD OFF OR FALL ASLEEP WHILE WATCHING TV: WOULD NEVER DOZE

## 2024-02-26 ENCOUNTER — TELEPHONE (OUTPATIENT)
Dept: ENDOCRINOLOGY | Facility: CLINIC | Age: 29
End: 2024-02-26

## 2024-02-26 ENCOUNTER — VIRTUAL VISIT (OUTPATIENT)
Dept: ENDOCRINOLOGY | Facility: CLINIC | Age: 29
End: 2024-02-26
Attending: FAMILY MEDICINE
Payer: COMMERCIAL

## 2024-02-26 VITALS — HEIGHT: 64 IN | BODY MASS INDEX: 50.02 KG/M2 | WEIGHT: 293 LBS

## 2024-02-26 DIAGNOSIS — E66.01 MORBID OBESITY (H): ICD-10-CM

## 2024-02-26 DIAGNOSIS — E11.9 TYPE 2 DIABETES MELLITUS WITHOUT COMPLICATION, WITHOUT LONG-TERM CURRENT USE OF INSULIN (H): Primary | ICD-10-CM

## 2024-02-26 PROCEDURE — 99204 OFFICE O/P NEW MOD 45 MIN: CPT | Mod: 95 | Performed by: INTERNAL MEDICINE

## 2024-02-26 ASSESSMENT — PAIN SCALES - GENERAL: PAINLEVEL: NO PAIN (0)

## 2024-02-26 ASSESSMENT — PATIENT HEALTH QUESTIONNAIRE - PHQ9: SUM OF ALL RESPONSES TO PHQ QUESTIONS 1-9: 10
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
## 2024-02-26 NOTE — PROGRESS NOTES
Virtual Visit Details    Joined the call at 2/26/2024, 8:29:49 am.  Left the call at 2/26/2024, 9:01:30 am    Originating Location (pt. Location): Home    Distant Location (provider location):  Off-site  Platform used for Video Visit: Dona

## 2024-02-26 NOTE — NURSING NOTE
Is the patient currently in the state of [place]? YES    Visit mode:VIDEO    If the visit is dropped, the patient can be reconnected by: VIDEO VISIT: Text to cell phone:   Telephone Information:   Mobile [phone]       Will anyone else be joining the visit? NO  (If patient encounters technical issues they should call [phone] :[phone])    How would you like to obtain your AVS? MyChart    Are changes needed to the allergy or medication list? Pt stated no changes to allergies and Pt stated no med changes  Please remove any meds marked not taking and any flagged for removal.    Reason for visit: Consult    Wt/ht other than 24 hrs:  1/16  Pain more than one location:  no  [place] [facility]     HIGH PHQ2    Depression Response    Patient completed the PHQ-9 assessment for depression and scored >9? Yes  Question 9 on the PHQ-9 was positive for suicidality? No  Does patient have current mental health provider? Yes    Is this a virtual visit? Yes   Does patient have suicidal ideation (positive question 9)? No - offer to place Mental Health Referral.  Patient declined referral/not needed    I personally notified the following: visit provider

## 2024-02-26 NOTE — TELEPHONE ENCOUNTER
Prior Authorization Approval    Medication: OZEMPIC (0.25 OR 0.5 MG/DOSE) 2 MG/3ML SC SOPN  Authorization Effective Date: 1/27/2024  Authorization Expiration Date: 2/25/2025  Approved Dose/Quantity: uud   Reference #: Key: [id number]   Insurance Company: [employer]-Specialty [employer]'s - Phone [phone] Fax [phone]  Expected CoPay: $    CoPay Card Available:      Financial Assistance Needed:    Which Pharmacy is filling the prescription: [id number] - [place], [place] - [address]  Pharmacy Notified: Yes  Patient Notified: Yes     Key: [id number]

## 2024-02-27 ENCOUNTER — TELEPHONE (OUTPATIENT)
Dept: ENDOCRINOLOGY | Facility: CLINIC | Age: 29
End: 2024-02-27
Payer: COMMERCIAL

## 2024-02-27 NOTE — TELEPHONE ENCOUNTER
Left Voicemail (1st Attempt) and Sent Mychart (1st Attempt) for the patient to call back and schedule the following:    Appointment type: [facility]  Provider: Dr Ndiaye  Return date: around 6/26/24  Specialty phone number: [phone]  Additional appointment(s) needed: no  Additonal Notes: no

## 2024-04-01 ENCOUNTER — TRANSFERRED RECORDS (OUTPATIENT)
Dept: MULTI SPECIALTY CLINIC | Facility: CLINIC | Age: 29
End: 2024-04-01

## 2024-04-01 LAB — RETINOPATHY: NORMAL

## 2024-05-19 ENCOUNTER — HEALTH MAINTENANCE LETTER (OUTPATIENT)
Age: 29
End: 2024-05-19

## 2024-05-19 NOTE — PROGRESS NOTES
Return Medical Weight Management Note     Briana Gordon  MRN:  [id number]  :  1995  RENY:  2024    Dear Chris Carrillo MD,    I had the pleasure of seeing your patient Briana Gordon. She is a 28 year old female who I am continuing to see for treatment of obesity related to:        2024     1:08 PM   --   I have the following health issues associated with obesity Pre-Diabetes    High Cholesterol    Asthma   I have the following symptoms associated with obesity Depression    Hip Pain       Assessment & Plan   Problem List Items Addressed This Visit       Diabetes mellitus, type 2 (H)    Relevant Orders    Hemoglobin A1c    Comprehensive metabolic panel     Other Visit Diagnoses       Class 3 severe obesity with serious comorbidity and body mass index (BMI) of 45.0 to 49.9 in adult, unspecified obesity type (H)    -  Primary    Relevant Orders    Hemoglobin A1c    Comprehensive metabolic panel           Plan  Continue ozempic 0.5mg, contact clinic if interested increasing (seeing worsening cravings, weight regain), carb maximum of 150 carbohydrates   Goals we discussed today: getting 100% protein goal on myfitnesspal, increasing dietary fiber, exploring weight training exercises  Labs ordered today: A1c, cmp   Follow up with Terra in 3 months   Continue working with your [facility] Dietician on making nutrition adjustments towards improved blood sugar and weight loss   Keep up the excellent work!         INTERVAL HISTORY:  New MWM with Dr. Ndiaye 24  Has steadily put on 10lbs every year since high school. Weight was about 180 at age 16. Was on prozac for 10 years, switched to welbutrin and zoloft . Family history of type 2 diabetes. Recently diagnosed with ADHD, started vyvanse Dec 2023. 2023 [facility] binge eating disorder- in a 10 week program, gained about 20 lbs. Substantially improved binge eating- no binge eating since December. Uses myfitnesspal to track  "macros. Tracking calories can be triggering to eating disorder symptoms. Has dietitian meeting coming up in March.   Started ozempic     Since last visit:   Started adderall through psychiatry but stopped 2 weeks ago due to lack of efficacy.     Ozempic 0.5mg for the last 6 weeks, feel it is helpful in reducing cravings.     3 weeks ago weighed 283lbs, feels this is largely attributable to no longer binging after completing treatment through [facility] (binge free since November 2023) as well as the ozempic. Has not been able to weigh herself since as she's in the process of moving.     Wt Readings from Last 5 Encounters:   05/20/24 128.4 kg (283 lb)   02/26/24 139.7 kg (308 lb)   01/16/24 139.7 kg (308 lb)   08/15/23 129.3 kg (285 lb)   12/16/22 129.3 kg (285 lb)       Anti-obesity medication history    Current:   Ozempic 0.5mg- some nausea day after injection, improving week to week. No vomiting.   Metformin 1000mg- through pcp, no side effects.   Wellbutrin 150mg- through psychiatrist, no side effects     Past/Failed/contraindicated:       GERD symptoms: GERD at baseline managed with daily omeprazole, no worsening since starting ozempic. Breakthrough symptoms after eating something really spicy.     Constipation/Diarrhea: none     Recent diet changes: working on increasing variety of foods, more fiber, more fresh fruits and vegetables   Protein : \"not great,\" logs food on [employer] pal, describes her protein is typically 50% of her daily goal, cannot recall number of grams that her goal is currently. Doesn't eat a lot of meat, likes nuts, dairy products.       Recent exercise/activity changes: Not currently working, very busy with moving and unpacking. Has a yard, has been busy with gardening, cleaning and house upkeep. Lifting lots of heavy boxes. Walking less as her dog now has a yard, hoping to work more on getting steps up. Yoga twice a week.     Recent stressors: just bought a house, in the process of " "moving in.     Vitamins/Labs: A1c and cmp     Pregnancy: not in a relationship where she can become pregnant     CURRENT WEIGHT:   283 lbs 0 oz    Initial Weight (lbs): 308 lbs  Last Visits Weight: 139.7 kg (308 lb)  Cumulative weight loss (lbs): 25  Weight Loss Percentage: 8.12%        5/20/2024    11:17 AM   Changes and Difficulties   I have made the following changes to my diet since my last visit: Increase in variety of food groups with each meal, decrease in high carb snacks   With regards to my diet, I am still struggling with: Remembering to eat breakfast   I have made the following changes to my activity/exercise since my last visit: Increased use of stairs, heavy lifting   With regards to my activity/exercise, I am still struggling with: Get 10,000 steps daily (average about 6,000)             MEDICATIONS:   Current Outpatient Medications   Medication Sig Dispense Refill    buPROPion (WELLBUTRIN XL) 150 MG 24 hr tablet Take 1 tablet (150 mg) by mouth every morning 90 tablet 1    metFORMIN (GLUCOPHAGE XR) 500 MG 24 hr tablet Take 2 tablets (1,000 mg) by mouth daily (with dinner) 180 tablet 3    omeprazole (PRILOSEC) 20 MG DR capsule Take 1 capsule (20 mg) by mouth daily 30 capsule 0    semaglutide (OZEMPIC) 2 MG/3ML pen Inject 0.5 mg Subcutaneous every 7 days for 48 doses 3 mL 11    sertraline (ZOLOFT) 50 MG tablet Take 1 tablet (50 mg) by mouth daily 90 tablet 1           5/20/2024    11:17 AM   Weight Loss Medication History Reviewed With Patient   Which weight loss medications are you currently taking on a regular basis? Ozempic    Bupropion   Are you having any side effects from the weight loss medication that we have prescribed you? Yes   If you are having side effects please describe: Occasional nausea and loss of appetite               2/24/2024    12:41 PM   EVERARDO Score (Last Two)   EVERARDO Raw Score 35   Activation Score 72.1   EVERARDO Level 3         PHYSICAL EXAM:  Objective    Ht 1.626 m (5' 4.02\")   Wt " 128.4 kg (283 lb)   BMI 48.55 kg/m      Vitals - Patient Reported  Pain Score: No Pain (0)      Vitals:  No vitals were obtained today due to virtual visit.    GENERAL: alert and no distress  EYES: Eyes grossly normal to inspection.  No discharge or erythema, or obvious scleral/conjunctival abnormalities.  RESP: No audible wheeze, cough, or visible cyanosis.    SKIN: Visible skin clear. No significant rash, abnormal pigmentation or lesions.  NEURO: Cranial nerves grossly intact.  Mentation and speech appropriate for age.  PSYCH: Appropriate affect, tone, and pace of words        Sincerely,    Terra Bell PA-C

## 2024-05-20 ENCOUNTER — VIRTUAL VISIT (OUTPATIENT)
Dept: ENDOCRINOLOGY | Facility: CLINIC | Age: 29
End: 2024-05-20
Payer: COMMERCIAL

## 2024-05-20 VITALS — WEIGHT: 283 LBS | HEIGHT: 64 IN | BODY MASS INDEX: 48.32 KG/M2

## 2024-05-20 DIAGNOSIS — E66.01 CLASS 3 SEVERE OBESITY WITH SERIOUS COMORBIDITY AND BODY MASS INDEX (BMI) OF 45.0 TO 49.9 IN ADULT, UNSPECIFIED OBESITY TYPE (H): Primary | ICD-10-CM

## 2024-05-20 DIAGNOSIS — E11.9 TYPE 2 DIABETES MELLITUS WITHOUT COMPLICATION, WITHOUT LONG-TERM CURRENT USE OF INSULIN (H): ICD-10-CM

## 2024-05-20 DIAGNOSIS — E66.813 CLASS 3 SEVERE OBESITY WITH SERIOUS COMORBIDITY AND BODY MASS INDEX (BMI) OF 45.0 TO 49.9 IN ADULT, UNSPECIFIED OBESITY TYPE (H): Primary | ICD-10-CM

## 2024-05-20 PROCEDURE — 99202 OFFICE O/P NEW SF 15 MIN: CPT | Mod: 95

## 2024-05-20 ASSESSMENT — PAIN SCALES - GENERAL: PAINLEVEL: NO PAIN (0)

## 2024-05-20 NOTE — PROGRESS NOTES
Virtual Visit Details    Type of service:  Video Visit     Originating Location (pt. Location): Home    Distant Location (provider location):  Off-site  Platform used for Video Visit: Dona

## 2024-05-20 NOTE — LETTER
2024       RE: Briana Gordon  [address]  [facility] [address]     Dear Colleague,    Thank you for referring your patient, Briana Gordon, to the [facility] [place] at [facility]. Please see a copy of my visit note below.      Return Medical Weight Management Note     Briana Gordon  MRN:  [id number]  :  1995  RENY:  2024    Dear Chris Carrillo MD,    I had the pleasure of seeing your patient Briana Gordon. She is a 28 year old female who I am continuing to see for treatment of obesity related to:        2024     1:08 PM   --   I have the following health issues associated with obesity Pre-Diabetes    High Cholesterol    Asthma   I have the following symptoms associated with obesity Depression    Hip Pain       Assessment & Plan  Problem List Items Addressed This Visit       Diabetes mellitus, type 2 (H)    Relevant Orders    Hemoglobin A1c    Comprehensive metabolic panel     Other Visit Diagnoses       Class 3 severe obesity with serious comorbidity and body mass index (BMI) of 45.0 to 49.9 in adult, unspecified obesity type (H)    -  Primary    Relevant Orders    Hemoglobin A1c    Comprehensive metabolic panel           Plan  Continue ozempic 0.5mg, contact clinic if interested increasing (seeing worsening cravings, weight regain), carb maximum of 150 carbohydrates   Goals we discussed today: getting 100% protein goal on myfitnesspal, increasing dietary fiber, exploring weight training exercises  Labs ordered today: A1c, cmp   Follow up with Terra in 3 months   Continue working with your [facility] Dietician on making nutrition adjustments towards improved blood sugar and weight loss   Keep up the excellent work!         INTERVAL HISTORY:  New MWM with Dr. Ndiaye 24  Has steadily put on 10lbs every year since high school. Weight was about 180 at age 16. Was on prozac for 10 years,  "switched to welbutrin and zoloft 2023. Family history of type 2 diabetes. Recently diagnosed with ADHD, started vyvanse Dec 2023. Fall 2023 [facility] binge eating disorder- in a 10 week program, gained about 20 lbs. Substantially improved binge eating- no binge eating since December. Uses [employer] to track macros. Tracking calories can be triggering to eating disorder symptoms. Has dietitian meeting coming up in March.   Started ozempic     Since last visit:   Started adderall through psychiatry but stopped 2 weeks ago due to lack of efficacy.     Ozempic 0.5mg for the last 6 weeks, feel it is helpful in reducing cravings.     3 weeks ago weighed 283lbs, feels this is largely attributable to no longer binging after completing treatment through [facility] (binge free since November 2023) as well as the ozempic. Has not been able to weigh herself since as she's in the process of moving.     Wt Readings from Last 5 Encounters:   05/20/24 128.4 kg (283 lb)   02/26/24 139.7 kg (308 lb)   01/16/24 139.7 kg (308 lb)   08/15/23 129.3 kg (285 lb)   12/16/22 129.3 kg (285 lb)       Anti-obesity medication history    Current:   Ozempic 0.5mg- some nausea day after injection, improving week to week. No vomiting.   Metformin 1000mg- through pcp, no side effects.   Wellbutrin 150mg- through psychiatrist, no side effects     Past/Failed/contraindicated:       GERD symptoms: GERD at baseline managed with daily omeprazole, no worsening since starting ozempic. Breakthrough symptoms after eating something really spicy.     Constipation/Diarrhea: none     Recent diet changes: working on increasing variety of foods, more fiber, more fresh fruits and vegetables   Protein : \"not great,\" logs food on [employer] pal, describes her protein is typically 50% of her daily goal, cannot recall number of grams that her goal is currently. Doesn't eat a lot of meat, likes nuts, dairy products.       Recent exercise/activity changes: " Not currently working, very busy with moving and unpacking. Has a yard, has been busy with gardening, cleaning and house upkeep. Lifting lots of heavy boxes. Walking less as her dog now has a yard, hoping to work more on getting steps up. Yoga twice a week.     Recent stressors: just bought a house, in the process of moving in.     Vitamins/Labs: A1c and cmp     Pregnancy: not in a relationship where she can become pregnant     CURRENT WEIGHT:   283 lbs 0 oz    Initial Weight (lbs): 308 lbs  Last Visits Weight: 139.7 kg (308 lb)  Cumulative weight loss (lbs): 25  Weight Loss Percentage: 8.12%        5/20/2024    11:17 AM   Changes and Difficulties   I have made the following changes to my diet since my last visit: Increase in variety of food groups with each meal, decrease in high carb snacks   With regards to my diet, I am still struggling with: Remembering to eat breakfast   I have made the following changes to my activity/exercise since my last visit: Increased use of stairs, heavy lifting   With regards to my activity/exercise, I am still struggling with: Get 10,000 steps daily (average about 6,000)             MEDICATIONS:   Current Outpatient Medications   Medication Sig Dispense Refill    buPROPion (WELLBUTRIN XL) 150 MG 24 hr tablet Take 1 tablet (150 mg) by mouth every morning 90 tablet 1    metFORMIN (GLUCOPHAGE XR) 500 MG 24 hr tablet Take 2 tablets (1,000 mg) by mouth daily (with dinner) 180 tablet 3    omeprazole (PRILOSEC) 20 MG DR capsule Take 1 capsule (20 mg) by mouth daily 30 capsule 0    semaglutide (OZEMPIC) 2 MG/3ML pen Inject 0.5 mg Subcutaneous every 7 days for 48 doses 3 mL 11    sertraline (ZOLOFT) 50 MG tablet Take 1 tablet (50 mg) by mouth daily 90 tablet 1           5/20/2024    11:17 AM   Weight Loss Medication History Reviewed With Patient   Which weight loss medications are you currently taking on a regular basis? Ozempic    Bupropion   Are you having any side effects from the weight  "loss medication that we have prescribed you? Yes   If you are having side effects please describe: Occasional nausea and loss of appetite               2/24/2024    12:41 PM   EVERARDO Score (Last Two)   EVERARDO Raw Score 35   Activation Score 72.1   EVERARDO Level 3         PHYSICAL EXAM:  Objective   Ht 1.626 m (5' 4.02\")   Wt 128.4 kg (283 lb)   BMI 48.55 kg/m      Vitals - Patient Reported  Pain Score: No Pain (0)      Vitals:  No vitals were obtained today due to virtual visit.    GENERAL: alert and no distress  EYES: Eyes grossly normal to inspection.  No discharge or erythema, or obvious scleral/conjunctival abnormalities.  RESP: No audible wheeze, cough, or visible cyanosis.    SKIN: Visible skin clear. No significant rash, abnormal pigmentation or lesions.  NEURO: Cranial nerves grossly intact.  Mentation and speech appropriate for age.  PSYCH: Appropriate affect, tone, and pace of words        Sincerely,    Terra Bell PA-C          Virtual Visit Details    Type of service:  Video Visit     Originating Location (pt. Location): Home    Distant Location (provider location):  Off-site  Platform used for Video Visit: Dona    "

## 2024-05-20 NOTE — NURSING NOTE
Is the patient currently in the state of [place]? YES    Visit mode:VIDEO    If the visit is dropped, the patient can be reconnected by: VIDEO VISIT: Text to cell phone:   Telephone Information:   Mobile [phone]       Will anyone else be joining the visit? NO  (If patient encounters technical issues they should call [phone])    How would you like to obtain your AVS? MyChart    Are changes needed to the allergy or medication list? No    Are refills needed on medications prescribed by this physician? NO, but would like to discuss ozempic dosage and if needed refill.    Reason for visit: RECHCHRISTOPHER LAZAR

## 2024-05-20 NOTE — PATIENT INSTRUCTIONS
"Thank you for allowing us the privilege of caring for you. We hope we provided you with the excellent service you deserve.   Please let us know if there is anything else we can do for you so that we can be sure you are completely satisfied with your care experience.    To ensure the quality of our services you may be receiving a patient satisfaction survey from an independent patient satisfaction monitoring company.    The greatest compliment you can give is a \"Likely to Recommend\"    Your visit was with Terra Bell PA-C today.    Instructions per today's visit:     Augusto Gordon, it was great to visit with you today.  Here is a review of our visit.  If our clinic scheduler is not able to reach you please call [phone] to schedule your next appointments.    Plan  Continue ozempic 0.5mg, contact clinic if interested increasing (seeing worsening cravings, weight regain), carb maximum of 150 carbohydrates   Goals we discussed today: getting 100% protein goal on myfitnesspal, increasing dietary fiber, exploring weight training exercises   Labs ordered today: A1c, cmp   Follow up with Terra in 3 months   Continue working with your [facility] Dietician on making nutrition adjustments towards improved blood sugar and weight loss   Keep up the excellent work!       Information about Video Visits with [facility]: video visit information  _________________________________________________________________________________________________________________________________________________________  If you are asked by your clinic team to have your blood pressure checked:  [facility] do offer several locations for blood pressure checks. Please follow the below link to schedule an appointment. Scheduling an appointment at the pharmacy for a blood pressure check is now preferred.    Appointment Plus " ([email or web address])  _________________________________________________________________________________________________________________________________________________________  Important contact and scheduling information:  Please call our contact center at [phone] to schedule your next appointments.  To find a lab location near you, please call [phone].  For any nursing questions or concerns call Fay Longoria LPN at [phone] or Deisy Solano RN at [phone]  Please call during clinic hours Monday through Friday 8:00a - 4:00p if you have questions or you can contact us via [email or web address] at anytime and we will reply during clinic hours.    Lab results will be communicated through My Chart or letter (if My Chart not used). Please call the clinic if you have not received communication after 1 week or if you have any questions.?  Clinic Fax: [phone]    _________________________________________________________________________________________________________________________________________________________  Meal Replacement Products:    Here is the link to our new e-store where you can purchase our meal replacement products    [facility] E-Store  [email or web address]    The one week starter kit is a great way to sample a variety of products and see what works for you.    If you want more information about the product go to: [employer] Meals  [email or web address]    If you are an employee or [facility] or [facility] please contact your care team for a 10% estore discount    Free Shipping for orders over $75     Benefits of meal replacements products:    Portion and calorie control  Improved nutrition  Structured eating  Simplified food choices  Avoid contact with trigger foods  _________________________________________________________________________________________________________________________________________________________  Interested in working with a health  ?  Health coaches work with you to improve your overall health and wellbeing.  They look at the whole person, and may involve discussion of different areas of life, including, but not limited to the four pillars of health (sleep, exercise, nutrition, and stress management). Discuss with your care team if you would like to start working a health .  Health Coaching-3 Pack: Schedule by calling [phone]    $99 for three health coaching visits    Visits may be done in person or via phone    Coaching is a partnership between the  and the client; Coaches do not prescribe or diagnose    Coaching helps inspire the client to reach his/her personal goals   _________________________________________________________________________________________________________________________________________________________  24 Week Healthy Lifestyle Plan:    Our mission in the 24-week Healthy Lifestyle Plan is to provide you with individualized care by giving you the tools, education and support you need to lose weight and maintain a healthy lifestyle. In your 24-week journey, you ll be supported by a dedicated weight loss team that includes registered dietitians, medical weight management providers, health coaches, and nurses -- all with special expertise in weight loss -- to help you every step of the way.     Monthly meetings with your registered dietician or medical weight management provider help to review your progress, update your care plan, and make any adjustments needed to ensure success. Between these visits, weekly and bi-weekly health  visits will help you focus on the four pillars of weight loss -- stress, sleep, nutrition, and exercise -- and how you can best adapt each to achieve sustainable weight loss results.    In addition, you will be given exclusive access to online wellbeing classes through [employer].  Your initial visit will be with a medical weight management provider who will help to  understand your weight loss goals and ensure this program is the right fit for you. Please let our team know if you are interested in the 24 week plan by sending a message to your care team or calling [phone] to schedule.  _________________________________________________________________________________________________________________________________________________________  __________  [facility] Get Moving Program  Our team of physical therapists is trained to help you understand and take control of your condition. They will perform a thorough evaluation to determine your ability for activity and develop a customized plan to fit your goals and physical ability.  Scheduling: Unsure if the Get Moving program is right for you? Discuss the program with your medical provider or diabetes educator. You can also call us at [phone] to ask questions or schedule an appointment.   [facility] Get Moving Program  ____________________________________________________________________________________________________________________________________________________________________________  [facility] Diabetes Prevention Program (DPP)  If you have prediabetes and Medicare please contact us via [employer] to learn more about the Diabetes Prevention Program (DPP)  Program Details:   [employer] [facility] offers the year-long Diabetes Prevention Program (DPP). The program helps you to make lifestyle changes that prevent or delay type 2 diabetes by supporting healthy eating, increased physical activity, stress reduction and use of coping skills.   On average, previous [facility] DPP cohorts have lost and maintained at least 5% of their starting weight throughout the program and averaged more than 150 minutes of physical activity per week.  Participants meet weekly for one-hour group sessions over sixteen weeks, every other week for the next 8 weeks, and monthly for the last six months.   A year-long  maintenance program is also available for participants who complete the first year.   Location & Cost:   During the COVID-19 Public Health Emergency, the program is offered virtually. When in-person classes can resume, they will be held at [facility].  For people with Medicare, the program is covered in full. A self-pay option will also be available for those with non-Medicare insurance plans.   ______________________________________________________________________________________________________________________________________________________________________________________________________________________________    To work with a Behavioral Health Psychologist:    Call to schedule:    Arias Barrios - [phone]  Garfield Crawford - [phone]  Jazmine Forrest - [phone]  Gisela Padilla - [phone]   Jenelle Powell PhD (cannot accept Medicare) [phone]        Thank you,   [facility] Comprehensive Weight Management Team

## 2024-05-22 ENCOUNTER — TELEPHONE (OUTPATIENT)
Dept: ENDOCRINOLOGY | Facility: CLINIC | Age: 29
End: 2024-05-22
Payer: COMMERCIAL

## 2024-05-22 NOTE — TELEPHONE ENCOUNTER
Left Voicemail (1st Attempt) for the patient to call back and schedule the following:    Appointment type: rtn weight mgmt  Provider: Terra Bell  Return date: Aug 2024  Specialty phone number: [phone]

## 2024-06-28 NOTE — PROGRESS NOTES
"  Assessment & Plan   Problem List Items Addressed This Visit          Endocrine    Diabetes mellitus, type 2 (H)     Other Visit Diagnoses       Attention deficit hyperactivity disorder (ADHD), combined type    -  Primary    Relevant Orders    Adult Genetics & Metabolism Referral           Deferring to endocrinology with respects to dose adjustments with Ozempic. I am comfortable taking over management of this medication if Briana becomes frustrated with communicating with endocrinology.     We discussed basics of Genesight testing. Jazmine would like to proceed with meeting genetic counselor and possibly genetic testing. Referral placed as noted above.        BMI  Estimated body mass index is 48.32 kg/m  as calculated from the following:    Height as of this encounter: 1.63 m (5' 4.17\").    Weight as of this encounter: 128.4 kg (283 lb).   Weight management plan: ongoing management with endocrinology.    The longitudinal plan of care for the diagnosis(es)/condition(s) as documented were addressed during this visit. Due to the added complexity in care, I will continue to support Briana in the subsequent management and with ongoing continuity of care.    31 minutes spent on the date of the encounter doing chart review, history and exam, documentation and further activities as noted.    Chris Carrillo MD  2:43 PM, July 1, 2024        Subjective   Briana is a 28 year old, presenting for the following health issues:  Recheck Medication and Follow Up (A1C testing and questions on Gene Sight testing)      Via the Health Maintenance questionnaire, the patient has reported the following services have been completed -Eye Exam: [facility] 2024-04-01, this information has been sent to the abstraction team.  HPI   \"Ozempic progress, A1c labs, question on GeneSight testing\"  - sees endocrinology for management of DM2  - they have a pended A1c order  - most recently, A1c dropped from 7.2 to 6.3 in January of this year  - BMI " "drop from 52.8 to 48.3  - she does note, her previous tendency to snack went away when she started Ozempic, but she has felt that impulse again recently  - she wonders if it might be time for her to consider increasing the dose of her medication.    ADHD  - has been working with psychiatry to find the right medication to help manage symptoms  - has tried Adderall XR as varying strengths, Vyvanse at varying strengths, both of which did not seem to have much of an effect  - most recently tried Concerta but this exacerbated her depression so she stopped it  - she is interested in learning more about Genesight testing to see if it can help determine a good medication and dose for her        Review of Systems  Constitutional, HEENT, cardiovascular, pulmonary, gi and gu systems are negative, except as otherwise noted.      Objective    /83   Pulse 91   Temp 97.7  F (36.5  C)   Ht 1.63 m (5' 4.17\")   Wt 128.4 kg (283 lb)   SpO2 98%   BMI 48.32 kg/m    Body mass index is 48.32 kg/m .    Physical Exam   GENERAL: alert and no distress  NECK: no adenopathy, no asymmetry, masses, or scars  RESP: lungs clear to auscultation - no rales, rhonchi or wheezes  CV: regular rate and rhythm, normal S1 S2, no S3 or S4, no murmur, click or rub, no peripheral edema  ABDOMEN: soft, nontender, no hepatosplenomegaly, no masses and bowel sounds normal  MS: no gross musculoskeletal defects noted, no edema          Signed Electronically by: Chris Carrillo MD    "

## 2024-07-01 ENCOUNTER — OFFICE VISIT (OUTPATIENT)
Dept: FAMILY MEDICINE | Facility: CLINIC | Age: 29
End: 2024-07-01
Payer: COMMERCIAL

## 2024-07-01 VITALS
BODY MASS INDEX: 48.32 KG/M2 | TEMPERATURE: 97.7 F | OXYGEN SATURATION: 98 % | HEIGHT: 64 IN | HEART RATE: 91 BPM | SYSTOLIC BLOOD PRESSURE: 130 MMHG | WEIGHT: 283 LBS | DIASTOLIC BLOOD PRESSURE: 83 MMHG

## 2024-07-01 DIAGNOSIS — F90.2 ATTENTION DEFICIT HYPERACTIVITY DISORDER (ADHD), COMBINED TYPE: Primary | ICD-10-CM

## 2024-07-01 DIAGNOSIS — E11.9 TYPE 2 DIABETES MELLITUS WITHOUT COMPLICATION, WITHOUT LONG-TERM CURRENT USE OF INSULIN (H): ICD-10-CM

## 2024-07-01 LAB — HBA1C MFR BLD: 6.1 % (ref 0–5.6)

## 2024-07-01 PROCEDURE — 83036 HEMOGLOBIN GLYCOSYLATED A1C: CPT | Mod: 95 | Performed by: PATHOLOGY

## 2024-07-01 PROCEDURE — 80053 COMPREHEN METABOLIC PANEL: CPT

## 2024-07-01 PROCEDURE — 99000 SPECIMEN HANDLING OFFICE-LAB: CPT | Performed by: PATHOLOGY

## 2024-07-02 LAB
ALBUMIN SERPL BCG-MCNC: 3.9 G/DL (ref 3.5–5.2)
ALP SERPL-CCNC: 76 U/L (ref 40–150)
ALT SERPL W P-5'-P-CCNC: 14 U/L (ref 0–50)
ANION GAP SERPL CALCULATED.3IONS-SCNC: 10 MMOL/L (ref 7–15)
AST SERPL W P-5'-P-CCNC: 16 U/L (ref 0–45)
BILIRUB SERPL-MCNC: <0.2 MG/DL
BUN SERPL-MCNC: 11.2 MG/DL (ref 6–20)
CALCIUM SERPL-MCNC: 9.3 MG/DL (ref 8.6–10)
CHLORIDE SERPL-SCNC: 103 MMOL/L (ref 98–107)
CREAT SERPL-MCNC: 0.6 MG/DL (ref 0.51–0.95)
DEPRECATED HCO3 PLAS-SCNC: 25 MMOL/L (ref 22–29)
EGFRCR SERPLBLD CKD-EPI 2021: >90 ML/MIN/1.73M2
GLUCOSE SERPL-MCNC: 89 MG/DL (ref 70–99)
POTASSIUM SERPL-SCNC: 3.9 MMOL/L (ref 3.4–5.3)
PROT SERPL-MCNC: 7.2 G/DL (ref 6.4–8.3)
SODIUM SERPL-SCNC: 138 MMOL/L (ref 135–145)

## 2024-07-24 DIAGNOSIS — E66.813 CLASS 3 SEVERE OBESITY WITH SERIOUS COMORBIDITY AND BODY MASS INDEX (BMI) OF 45.0 TO 49.9 IN ADULT, UNSPECIFIED OBESITY TYPE (H): ICD-10-CM

## 2024-07-24 DIAGNOSIS — E11.9 TYPE 2 DIABETES MELLITUS WITHOUT COMPLICATION, WITHOUT LONG-TERM CURRENT USE OF INSULIN (H): Primary | ICD-10-CM

## 2024-07-24 DIAGNOSIS — E66.01 CLASS 3 SEVERE OBESITY WITH SERIOUS COMORBIDITY AND BODY MASS INDEX (BMI) OF 45.0 TO 49.9 IN ADULT, UNSPECIFIED OBESITY TYPE (H): ICD-10-CM

## 2024-08-21 ENCOUNTER — VIRTUAL VISIT (OUTPATIENT)
Dept: ENDOCRINOLOGY | Facility: CLINIC | Age: 29
End: 2024-08-21
Payer: COMMERCIAL

## 2024-08-21 VITALS — WEIGHT: 275.2 LBS | HEIGHT: 64 IN | BODY MASS INDEX: 46.98 KG/M2

## 2024-08-21 DIAGNOSIS — E66.813 CLASS 3 SEVERE OBESITY WITH SERIOUS COMORBIDITY AND BODY MASS INDEX (BMI) OF 45.0 TO 49.9 IN ADULT, UNSPECIFIED OBESITY TYPE (H): ICD-10-CM

## 2024-08-21 DIAGNOSIS — E66.01 CLASS 3 SEVERE OBESITY WITH SERIOUS COMORBIDITY AND BODY MASS INDEX (BMI) OF 45.0 TO 49.9 IN ADULT, UNSPECIFIED OBESITY TYPE (H): ICD-10-CM

## 2024-08-21 DIAGNOSIS — E11.9 TYPE 2 DIABETES MELLITUS WITHOUT COMPLICATION, WITHOUT LONG-TERM CURRENT USE OF INSULIN (H): ICD-10-CM

## 2024-08-21 PROCEDURE — G2211 COMPLEX E/M VISIT ADD ON: HCPCS | Mod: 95

## 2024-08-21 PROCEDURE — 99213 OFFICE O/P EST LOW 20 MIN: CPT | Mod: 95

## 2024-08-21 ASSESSMENT — PAIN SCALES - GENERAL: PAINLEVEL: SEVERE PAIN (6)

## 2024-08-21 NOTE — NURSING NOTE
Current patient location: car     Is the patient currently in the state of [place]? YES    Visit mode:VIDEO    If the visit is dropped, the patient can be reconnected by: VIDEO VISIT: Text to cell phone:   Telephone Information:   Mobile [phone]       Will anyone else be joining the visit? NO  (If patient encounters technical issues they should call [phone]    How would you like to obtain your AVS? MyChart    Are changes needed to the allergy or medication list? Pt stated no changes to allergies and Pt stated no med changes    Are refills needed on medications prescribed by this physician? NO    Rooming Documentation:  Questionnaire(s) completed    Reason for visit: RECHECK    Wt other than 24 hrs:    Pain more than one location:  no  [place] LAZAR

## 2024-08-21 NOTE — PROGRESS NOTES
Return Medical Weight Management Note     Briana Gordon  MRN:  [id number]  :  1995  RENY:  2024    Dear Chris Carrillo MD,    I had the pleasure of seeing your patient Briana Gordon. She is a 28 year old female who I am continuing to see for treatment of obesity related to:        2024     1:08 PM   --   I have the following health issues associated with obesity Pre-Diabetes    High Cholesterol    Asthma   I have the following symptoms associated with obesity Depression    Hip Pain       Assessment & Plan   Problem List Items Addressed This Visit       Diabetes mellitus, type 2 (H)    Relevant Orders    Comprehensive metabolic panel    Parathyroid Hormone Intact    Vitamin D Deficiency     Other Visit Diagnoses       Class 3 severe obesity with serious comorbidity and body mass index (BMI) of 45.0 to 49.9 in adult, unspecified obesity type (H)        Relevant Orders    Comprehensive metabolic panel    Parathyroid Hormone Intact    Vitamin D Deficiency           Plan  Continue ozempic 1mg, refills available   Avoid taking your daily pills on an empty stomach as this can increase the risk for nausea   Goals we discussed today:   Incorporating protein shakes into weekly protein intake. Popular brands include premier protein, fairlife protein. Can also explore protein powder, popular brand is isopure whey protein   Reincorporating weight training gradually as you settle into good routine with new job   Labs ordered today: Pth, vitamin d, cmp ordered today   Follow up with Terra in 3 months   Dietician appointment - not needed at this time through our clinic , meeting with nutritionist through [facility]   Keep up the excellent work!       INTERVAL HISTORY:  New MWM with Dr. Ndiaye 24  Has steadily put on 10lbs every year since high school. Weight was about 180 at age 16. Was on prozac for 10 years, switched to welbutrin and zoloft . Family history of type 2 diabetes. Recently  diagnosed with ADHD, started vyvanse Dec 2023. Fall 2023 [facility] binge eating disorder- in a 10 week program, gained about 20 lbs. Substantially improved binge eating- no binge eating since December. Uses [employer] to track macros. Tracking calories can be triggering to eating disorder symptoms. Has dietitian meeting coming up in March.   Started ozempic      New to me 5/20/24:   Started adderall through psychiatry but stopped 2 weeks ago due to lack of efficacy.      Ozempic 0.5mg for the last 6 weeks, feel it is helpful in reducing cravings.      3 weeks ago weighed 283lbs, feels this is largely attributable to no longer binging after completing treatment through [facility] (binge free since November 2023) as well as the ozempic. Has not been able to weigh herself since as she's in the process of moving.     -increased to 1mg via mychart 1 month ago     Today in visit (8/21/24)   8lbs weight loss since last visit, overall 32.8lbs loss since starting with our program.     Wt Readings from Last 5 Encounters:   08/21/24 124.8 kg (275 lb 3.2 oz)   07/01/24 128.4 kg (283 lb)   05/20/24 128.4 kg (283 lb)   02/26/24 139.7 kg (308 lb)   01/16/24 139.7 kg (308 lb)       Anti-obesity medication history    Current:   Ozempic 1mg- more help with cravings with the dose increase. Some nausea and loss of appetite with dose increase on the day or 2 after injection day. No vomiting, though isolated episode of almost vomiting after taking meds on empty stomach day after injection.     Past/Failed/contraindicated:         GERD symptoms: singular episode of heart burn and hiccups last night, wonders if it's related to eating too much too late at night.    Constipation/Diarrhea: none aside from loose stools after eating too much dairy. Improves with taking lactaid.     Recent diet changes: focusing on increasing protein and fiber. Continuing to log on myfitnesspal.  Discussed trying protein shakes to increase daily  protein.       Recent exercise/activity changes: moving more since starting new job. Goes for walks with client. Trying to build strength training back into her day with starting the new job.     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       Vitamins/Labs: currently taking zinc/vitamin d3 supplement. Pth, vitamin d, cmp ordered today       Pregnancy: not currently in relationship where she can become pregnant     CURRENT WEIGHT:   275 lbs 3.2 oz    Initial Weight (lbs): 308 lbs     Cumulative weight loss (lbs): 32.8  Weight Loss Percentage: 10.65%        8/21/2024     8:14 AM   Changes and Difficulties   I have made the following changes to my diet since my last visit: Increase in fiber   With regards to my diet, I am still struggling with: Getting enough protein   I have made the following changes to my activity/exercise since my last visit: Started a new job   With regards to my activity/exercise, I am still struggling with: Getting multiple walks weekly, strength training             MEDICATIONS:   Current Outpatient Medications   Medication Sig Dispense Refill    buPROPion (WELLBUTRIN XL) 150 MG 24 hr tablet Take 1 tablet (150 mg) by mouth every morning 90 tablet 1    metFORMIN (GLUCOPHAGE XR) 500 MG 24 hr tablet Take 2 tablets (1,000 mg) by mouth daily (with dinner) 180 tablet 3    omeprazole (PRILOSEC) 20 MG DR capsule Take 1 capsule (20 mg) by mouth daily 30 capsule 0    Semaglutide, 1 MG/DOSE, (OZEMPIC) 4 MG/3ML pen Inject 1 mg subcutaneously every 7 days 3 mL 3    sertraline (ZOLOFT) 50 MG tablet Take 1 tablet (50 mg) by mouth daily 90 tablet 1           8/21/2024     8:14 AM   Weight Loss Medication History Reviewed With Patient   Which weight loss medications are you currently taking on a regular basis? Ozempic   Are you having any side effects from the weight loss medication that we have prescribed you? Yes   If you are having side effects please describe: Nausea, loss of  "appetite               2/24/2024    12:41 PM   EVERARDO Score (Last Two)   EVERARDO Raw Score 35   Activation Score 72.1   EVERARDO Level 3         PHYSICAL EXAM:  Objective    Ht 1.626 m (5' 4\")   Wt 124.8 kg (275 lb 3.2 oz)   BMI 47.24 kg/m      Vitals - Patient Reported  Pain Score: Severe Pain (6)  Pain Loc:  (migraine)      Vitals:  No vitals were obtained today due to virtual visit.    GENERAL: alert and no distress  EYES: Eyes grossly normal to inspection.  No discharge or erythema, or obvious scleral/conjunctival abnormalities.  RESP: No audible wheeze, cough, or visible cyanosis.    SKIN: Visible skin clear. No significant rash, abnormal pigmentation or lesions.  NEURO: Cranial nerves grossly intact.  Mentation and speech appropriate for age.  PSYCH: Appropriate affect, tone, and pace of words        Sincerely,    Terra Bell PA-C      21 minutes spent by me on the date of the encounter doing chart review, history and exam, documentation and further activities per the note    The longitudinal plan of care for the diagnosis(es)/condition(s) as documented were addressed during this visit. Due to the added complexity in care, I will continue to support Briana in the subsequent management and with ongoing continuity of care.   "

## 2024-08-21 NOTE — LETTER
2024       RE: Briana Gordon  [address]  [facility] [address]     Dear Colleague,    Thank you for referring your patient, Briana Gordon, to the [facility] [place] [facility]. Please see a copy of my visit note below.      Return Medical Weight Management Note     Briana Gordon  MRN:  [id number]  :  1995  RENY:  2024    Dear Chris Carrillo MD,    I had the pleasure of seeing your patient Briana Gordon. She is a 28 year old female who I am continuing to see for treatment of obesity related to:        2024     1:08 PM   --   I have the following health issues associated with obesity Pre-Diabetes    High Cholesterol    Asthma   I have the following symptoms associated with obesity Depression    Hip Pain       Assessment & Plan  Problem List Items Addressed This Visit       Diabetes mellitus, type 2 (H)    Relevant Orders    Comprehensive metabolic panel    Parathyroid Hormone Intact    Vitamin D Deficiency     Other Visit Diagnoses       Class 3 severe obesity with serious comorbidity and body mass index (BMI) of 45.0 to 49.9 in adult, unspecified obesity type (H)        Relevant Orders    Comprehensive metabolic panel    Parathyroid Hormone Intact    Vitamin D Deficiency           Plan  Continue ozempic 1mg, refills available   Avoid taking your daily pills on an empty stomach as this can increase the risk for nausea   Goals we discussed today:   Incorporating protein shakes into weekly protein intake. Popular brands include premier protein, fairlife protein. Can also explore protein powder, popular brand is isopure whey protein   Reincorporating weight training gradually as you settle into good routine with new job   Labs ordered today: Pth, vitamin d, cmp ordered today   Follow up with Terra in 3 months   Dietician appointment - not needed at this time through our clinic , meeting with  nutritionist through [facility]   Keep up the excellent work!       INTERVAL HISTORY:  New MWM with Dr. Ndiaye 2/26/24  Has steadily put on 10lbs every year since high school. Weight was about 180 at age 16. Was on prozac for 10 years, switched to welbutrin and zoloft 2023. Family history of type 2 diabetes. Recently diagnosed with ADHD, started vyvanse Dec 2023. Fall 2023 [facility] binge eating disorder- in a 10 week program, gained about 20 lbs. Substantially improved binge eating- no binge eating since December. Uses [employer]fitnesspal to track macros. Tracking calories can be triggering to eating disorder symptoms. Has dietitian meeting coming up in March.   Started ozempic      New to me 5/20/24:   Started adderall through psychiatry but stopped 2 weeks ago due to lack of efficacy.      Ozempic 0.5mg for the last 6 weeks, feel it is helpful in reducing cravings.      3 weeks ago weighed 283lbs, feels this is largely attributable to no longer binging after completing treatment through [facility] (binge free since November 2023) as well as the ozempic. Has not been able to weigh herself since as she's in the process of moving.     -increased to 1mg via mychart 1 month ago     Today in visit (8/21/24)   8lbs weight loss since last visit, overall 32.8lbs loss since starting with our program.     Wt Readings from Last 5 Encounters:   08/21/24 124.8 kg (275 lb 3.2 oz)   07/01/24 128.4 kg (283 lb)   05/20/24 128.4 kg (283 lb)   02/26/24 139.7 kg (308 lb)   01/16/24 139.7 kg (308 lb)       Anti-obesity medication history    Current:   Ozempic 1mg- more help with cravings with the dose increase. Some nausea and loss of appetite with dose increase on the day or 2 after injection day. No vomiting, though isolated episode of almost vomiting after taking meds on empty stomach day after injection.     Past/Failed/contraindicated:         GERD symptoms: singular episode of heart burn and hiccups last night,  wonders if it's related to eating too much too late at night.    Constipation/Diarrhea: none aside from loose stools after eating too much dairy. Improves with taking lactaid.     Recent diet changes: focusing on increasing protein and fiber. Continuing to log on [email or web address].  Discussed trying protein shakes to increase daily protein.       Recent exercise/activity changes: moving more since starting new job. Goes for walks with client. Trying to build strength training back into her day with starting the new job.     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       Vitamins/Labs: currently taking zinc/vitamin d3 supplement. Pth, vitamin d, cmp ordered today       Pregnancy: not currently in relationship where she can become pregnant     CURRENT WEIGHT:   275 lbs 3.2 oz    Initial Weight (lbs): 308 lbs     Cumulative weight loss (lbs): 32.8  Weight Loss Percentage: 10.65%        8/21/2024     8:14 AM   Changes and Difficulties   I have made the following changes to my diet since my last visit: Increase in fiber   With regards to my diet, I am still struggling with: Getting enough protein   I have made the following changes to my activity/exercise since my last visit: Started a new job   With regards to my activity/exercise, I am still struggling with: Getting multiple walks weekly, strength training             MEDICATIONS:   Current Outpatient Medications   Medication Sig Dispense Refill     buPROPion (WELLBUTRIN XL) 150 MG 24 hr tablet Take 1 tablet (150 mg) by mouth every morning 90 tablet 1     metFORMIN (GLUCOPHAGE XR) 500 MG 24 hr tablet Take 2 tablets (1,000 mg) by mouth daily (with dinner) 180 tablet 3     omeprazole (PRILOSEC) 20 MG DR capsule Take 1 capsule (20 mg) by mouth daily 30 capsule 0     Semaglutide, 1 MG/DOSE, (OZEMPIC) 4 MG/3ML pen Inject 1 mg subcutaneously every 7 days 3 mL 3     sertraline (ZOLOFT) 50 MG tablet Take 1 tablet (50 mg) by mouth daily 90 tablet 1  "          8/21/2024     8:14 AM   Weight Loss Medication History Reviewed With Patient   Which weight loss medications are you currently taking on a regular basis? Ozempic   Are you having any side effects from the weight loss medication that we have prescribed you? Yes   If you are having side effects please describe: Nausea, loss of appetite               2/24/2024    12:41 PM   EVERARDO Score (Last Two)   EVERARDO Raw Score 35   Activation Score 72.1   EVERARDO Level 3         PHYSICAL EXAM:  Objective   Ht 1.626 m (5' 4\")   Wt 124.8 kg (275 lb 3.2 oz)   BMI 47.24 kg/m      Vitals - Patient Reported  Pain Score: Severe Pain (6)  Pain Loc:  (migraine)      Vitals:  No vitals were obtained today due to virtual visit.    GENERAL: alert and no distress  EYES: Eyes grossly normal to inspection.  No discharge or erythema, or obvious scleral/conjunctival abnormalities.  RESP: No audible wheeze, cough, or visible cyanosis.    SKIN: Visible skin clear. No significant rash, abnormal pigmentation or lesions.  NEURO: Cranial nerves grossly intact.  Mentation and speech appropriate for age.  PSYCH: Appropriate affect, tone, and pace of words        Sincerely,    Terra Bell PA-C      21 minutes spent by me on the date of the encounter doing chart review, history and exam, documentation and further activities per the note    The longitudinal plan of care for the diagnosis(es)/condition(s) as documented were addressed during this visit. Due to the added complexity in care, I will continue to support Briana in the subsequent management and with ongoing continuity of care.     Virtual Visit Details    Type of service:  Video Visit     Originating Location (pt. Location): Home    Distant Location (provider location):  Off-site  Platform used for Video Visit: AmWell      Again, thank you for allowing me to participate in the care of your patient.      Sincerely,    Terra Bell PA-C    "

## 2024-08-22 ENCOUNTER — MYC MEDICAL ADVICE (OUTPATIENT)
Dept: ORTHOPEDICS | Facility: CLINIC | Age: 29
End: 2024-08-22
Payer: COMMERCIAL

## 2024-08-22 NOTE — TELEPHONE ENCOUNTER
Left Voicemail (1st Attempt) and Sent Mychart (1st Attempt) for the patient to call back and schedule the following:    Appointment type: Return [facility]  Provider: Terra Bell  Return date: November

## 2024-10-06 ENCOUNTER — HEALTH MAINTENANCE LETTER (OUTPATIENT)
Age: 29
End: 2024-10-06

## 2024-11-06 ASSESSMENT — PATIENT HEALTH QUESTIONNAIRE - PHQ9: SUM OF ALL RESPONSES TO PHQ QUESTIONS 1-9: 14

## 2025-01-13 DIAGNOSIS — E11.9 TYPE 2 DIABETES MELLITUS WITHOUT COMPLICATION, WITHOUT LONG-TERM CURRENT USE OF INSULIN (H): ICD-10-CM

## 2025-01-13 RX ORDER — METFORMIN HYDROCHLORIDE 500 MG/1
1000 TABLET, EXTENDED RELEASE ORAL
Qty: 180 TABLET | Refills: 1 | Status: SHIPPED | OUTPATIENT
Start: 2025-01-13

## 2025-01-13 NOTE — TELEPHONE ENCOUNTER
Medication requested: metFORMIN (GLUCOPHAGE XR) 500 MG 24 hr table   Last office visit: 7/1/24  [facility] appointments: none  Medication last refilled: 1/16/24  Last qualifying labs:   Component      Latest Ref Rng 7/1/2024  2:39 PM   Hemoglobin A1C      0.0 - 5.6 % 6.1 (H)      Component      Latest Ref Rng 7/1/2024  2:39 PM   GFR Estimate      >60 mL/min/1.73m2 >90      Prescription approved per [facility] Refill Protocol.    Javed CASTILLO, RN  01/13/25 3:22 PM

## 2025-01-19 ENCOUNTER — HEALTH MAINTENANCE LETTER (OUTPATIENT)
Age: 30
End: 2025-01-19

## 2025-01-22 DIAGNOSIS — E66.01 CLASS 3 SEVERE OBESITY WITH SERIOUS COMORBIDITY AND BODY MASS INDEX (BMI) OF 45.0 TO 49.9 IN ADULT, UNSPECIFIED OBESITY TYPE (H): ICD-10-CM

## 2025-01-22 DIAGNOSIS — E11.9 TYPE 2 DIABETES MELLITUS WITHOUT COMPLICATION, WITHOUT LONG-TERM CURRENT USE OF INSULIN (H): ICD-10-CM

## 2025-01-22 DIAGNOSIS — E66.813 CLASS 3 SEVERE OBESITY WITH SERIOUS COMORBIDITY AND BODY MASS INDEX (BMI) OF 45.0 TO 49.9 IN ADULT, UNSPECIFIED OBESITY TYPE (H): ICD-10-CM

## 2025-01-27 NOTE — PROGRESS NOTES
Return Medical Weight Management Note     Briana Gordon  MRN:  [id number]  :  1995  RENY:  2025    Dear Chris Carrillo MD,    I had the pleasure of seeing your patient Briana Gordon. She is a 29 year old female who I am continuing to see for treatment of obesity related to:        2024     1:08 PM   --   I have the following health issues associated with obesity Pre-Diabetes    High Cholesterol    Asthma   I have the following symptoms associated with obesity Depression    Hip Pain       Assessment & Plan   Problem List Items Addressed This Visit       Diabetes mellitus, type 2 (H)    Relevant Medications    Semaglutide, 1 MG/DOSE, (OZEMPIC) 4 MG/3ML pen    Other Relevant Orders    Hemoglobin A1c    Comprehensive metabolic panel    Parathyroid Hormone Intact    Vitamin D Deficiency     Other Visit Diagnoses       Class 3 severe obesity with serious comorbidity and body mass index (BMI) of 45.0 to 49.9 in adult, unspecified obesity type (H)        Relevant Medications    Semaglutide, 1 MG/DOSE, (OZEMPIC) 4 MG/3ML pen    Other Relevant Orders    Hemoglobin A1c    Comprehensive metabolic panel    Parathyroid Hormone Intact    Vitamin D Deficiency           Plan  Continue ozempic 1mg, refills sent   Goals we discussed today:   Continuing to prioritize protein, explore more appetizing protein options   Continuing to prioritize fiber in fresh fruits and veggies   Explore ways to increase exercise throughout the week- could explore youtube exercises, weight resistance   Labs ordered today- A1c, vitamin d, pth, cmp   Follow up with Terra in 6 months  Dietician appointment not needed- working regularly with [employer] [facility]   Keep up the excellent work!       INTERVAL HISTORY:  New MWM with Dr. Ndiaye 24  Has steadily put on 10lbs every year since high school. Weight was about 180 at age 16. Was on prozac for 10 years, switched to welbutrin and zoloft . Family history of type 2 diabetes.  Recently diagnosed with ADHD, started vyvanse Dec 2023. Fall 2023 [facility] binge eating disorder- in a 10 week program, gained about 20 lbs. Substantially improved binge eating- no binge eating since December. Uses myfitnesspal to track macros. Tracking calories can be triggering to eating disorder symptoms. Has dietitian meeting coming up in March.   Started ozempic      New to me 5/20/24:   Started adderall through psychiatry but stopped 2 weeks ago due to lack of efficacy.      Ozempic 0.5mg for the last 6 weeks, feel it is helpful in reducing cravings.      3 weeks ago weighed 283lbs, feels this is largely attributable to no longer binging after completing treatment through [facility] (binge free since November 2023) as well as the ozempic. Has not been able to weigh herself since as she's in the process of moving.      -increased to 1mg via mychart between visit      Last seen by me 8/21/24   8lbs weight loss since last visit, overall 32.8lbs loss since starting with our program.   -continued ozempic 1mg     Today in visit 1/28/25   15lbs weight loss since last visit - 49lbs weight loss since starting with program, about 15% total body weight loss.   Improved mobility, improved energy with this weight loss.   Type 2 dm: Has not been tracking blood sugar, A1c July 2024 was 6.1.       Wt Readings from Last 5 Encounters:   01/28/25 117.9 kg (260 lb)   08/21/24 124.8 kg (275 lb 3.2 oz)   07/01/24 128.4 kg (283 lb)   05/20/24 128.4 kg (283 lb)   02/26/24 139.7 kg (308 lb)       Anti-obesity medication history    Current:   Ozempic 1mg- continuing to tolerate, feels it's continuing to be helpful with reducing cravings, reducing binge eating frequency.  Denies nausea, constipation.   Metformin- 1000mg daily- through pcp, continuing to tolerate- feels it was interestingly helpful with long covid symptoms (didn't take for a few days 2024 summer and felt long-covid symptoms recur)   Bupropion 150mg- through  pcp- continuing to tolerate         GERD symptoms: well managed with omeprazole 20mg daily       Recent diet changes: Hasn't been able to follow up with [facility] in a month, wanting to schedule a follow up appointment     Protein- hoping to improve, notices her blood sugar feels more stable on days she's getting more protein. Struggling to prioritize protein the last month- Used to hit protein goals with protein shakes, but currently these are not tasting good to her any longer. Increasing beef jerky lately.     Water - sipping through large water jug throughout the day.     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, dishes, cooking, vacuming, sweeping, dusting, laundry, making the bed, taking care of cats, sometimes going for a walk with clients),     Vitamins/Labs:  labs ordered at previous visit, discussed getting these done      Pregnancy: not sexually active in situation where pregnancy is possible     CURRENT WEIGHT:   260 lbs 0 oz    Initial Weight (lbs): 309 lbs  Last Visits Weight: 124.7 kg (275 lb)  Cumulative weight loss (lbs): 49  Weight Loss Percentage: 15.86%        8/21/2024     8:14 AM   Changes and Difficulties   I have made the following changes to my diet since my last visit: Increase in fiber   With regards to my diet, I am still struggling with: Getting enough protein   I have made the following changes to my activity/exercise since my last visit: Started a new job   With regards to my activity/exercise, I am still struggling with: Getting multiple walks weekly, strength training             MEDICATIONS:   Current Outpatient Medications   Medication Sig Dispense Refill    Semaglutide, 1 MG/DOSE, (OZEMPIC) 4 MG/3ML pen Inject 1 mg subcutaneously every 7 days. 9 mL 2    buPROPion (WELLBUTRIN XL) 150 MG 24 hr tablet Take 1 tablet (150 mg) by mouth every morning 90 tablet 1    metFORMIN (GLUCOPHAGE XR) 500 MG 24 hr tablet  "Take 2 tablets (1,000 mg) by mouth daily (with dinner). 180 tablet 1    omeprazole (PRILOSEC) 20 MG DR capsule Take 1 capsule (20 mg) by mouth daily 30 capsule 0    sertraline (ZOLOFT) 50 MG tablet Take 1 tablet (50 mg) by mouth daily 90 tablet 1           8/21/2024     8:14 AM   Weight Loss Medication History Reviewed With Patient   Which weight loss medications are you currently taking on a regular basis? Ozempic   Are you having any side effects from the weight loss medication that we have prescribed you? Yes   If you are having side effects please describe: Nausea, loss of appetite               2/24/2024    12:41 PM   EVERARDO Score (Last Two)   EVERARDO Raw Score 35   Activation Score 72.1   EVERARDO Level 3         PHYSICAL EXAM:  Objective    Ht 1.626 m (5' 4\")   Wt 117.9 kg (260 lb)   BMI 44.63 kg/m      Vitals - Patient Reported  Pain Score: No Pain (0)      Vitals:  No vitals were obtained today due to virtual visit.    GENERAL: alert and no distress  EYES: Eyes grossly normal to inspection.  No discharge or erythema, or obvious scleral/conjunctival abnormalities.  RESP: No audible wheeze, cough, or visible cyanosis.    SKIN: Visible skin clear. No significant rash, abnormal pigmentation or lesions.  NEURO: Cranial nerves grossly intact.  Mentation and speech appropriate for age.  PSYCH: Appropriate affect, tone, and pace of words        Sincerely,    Terra Bell PA-C      18 minutes spent by me on the date of the encounter doing chart review, history and exam, documentation and further activities per the note    The longitudinal plan of care for the diagnosis(es)/condition(s) as documented were addressed during this visit. Due to the added complexity in care, I will continue to support Briana in the subsequent management and with ongoing continuity of care.   "

## 2025-01-27 NOTE — TELEPHONE ENCOUNTER
Semaglutide, 1 MG/DOSE, (OZEMPIC) 4 MG/3ML pen 3 mL 1 11/22/2024 -- No   Sig - Route: Inject 1 mg subcutaneously every 7 days. - Subcutaneous       Terra Bell PA-C  Physician Assistant - Medical   Lv  8/21/24  Nv   1/28/25    Creatinine   Date Value Ref Range Status   07/01/2024 0.60 0.51 - 0.95 mg/dL Final       Refill decision: Medication refilled per  Medication Refill in Ambulatory Care  policy.  .     GLP-1 Agonists Protocol Failed - 1/27/2025  7:49 AM        Failed - HgbA1C in past 3 or 6 months     If HgbA1C is 8 or greater, it needs to be on file within the past 3 months.  If less than 8, must be on file within the past 6 months.     Recent Labs   Lab Test 07/01/24  1439   A1C 6.1*             Passed - Medication is active on med list        Passed - Has GFR on file in past 12 months and most recent value is normal        Passed - Recent (6 mo) or future (90 days) visit within the authorizing provider's specialty     The patient must have completed an in-person or virtual visit within the past 6 months or has a future visit scheduled within the next 90 days with the authorizing provider s specialty.  Urgent care and e-visits do not quality as an office visit for this protocol.          Passed - Patient is age 18 or older        Passed - No active pregnancy on record        Passed - No positive pregnancy test in past 12 months

## 2025-01-28 ENCOUNTER — VIRTUAL VISIT (OUTPATIENT)
Dept: ENDOCRINOLOGY | Facility: CLINIC | Age: 30
End: 2025-01-28
Payer: COMMERCIAL

## 2025-01-28 VITALS — BODY MASS INDEX: 44.39 KG/M2 | WEIGHT: 260 LBS | HEIGHT: 64 IN

## 2025-01-28 DIAGNOSIS — E66.813 CLASS 3 SEVERE OBESITY WITH SERIOUS COMORBIDITY AND BODY MASS INDEX (BMI) OF 45.0 TO 49.9 IN ADULT, UNSPECIFIED OBESITY TYPE (H): ICD-10-CM

## 2025-01-28 DIAGNOSIS — E66.01 CLASS 3 SEVERE OBESITY WITH SERIOUS COMORBIDITY AND BODY MASS INDEX (BMI) OF 45.0 TO 49.9 IN ADULT, UNSPECIFIED OBESITY TYPE (H): ICD-10-CM

## 2025-01-28 DIAGNOSIS — E11.9 TYPE 2 DIABETES MELLITUS WITHOUT COMPLICATION, WITHOUT LONG-TERM CURRENT USE OF INSULIN (H): ICD-10-CM

## 2025-01-28 ASSESSMENT — PAIN SCALES - GENERAL: PAINLEVEL_OUTOF10: NO PAIN (0)

## 2025-01-28 NOTE — PATIENT INSTRUCTIONS
"Thank you for allowing us the privilege of caring for you. We hope we provided you with the excellent service you deserve.   Please let us know if there is anything else we can do for you so that we can be sure you are completely satisfied with your care experience.    To ensure the quality of our services you may be receiving a patient satisfaction survey from an independent patient satisfaction monitoring company.    The greatest compliment you can give is a \"Likely to Recommend\"    Your visit was with Terra Bell PA-C today.    Instructions per today's visit:     Augusto Gordon, it was great to visit with you today.  Here is a review of our visit.  If our clinic scheduler is not able to reach you please call [phone] to schedule your next appointments.    Plan  Continue ozempic 1mg, refills sent   Goals we discussed today:   Continuing to prioritize protein, explore more appetizing protein options   Continuing to prioritize fiber in fresh fruits and veggies   Explore ways to increase exercise throughout the week- could explore youtube exercises, weight resistance   Labs ordered today- A1c, vitamin d, pth, cmp   Follow up with Terra in 6 months  Dietician appointment not needed- working regularly with [facility]   Keep up the excellent work!       Information about Video Visits with Leti [employer]: video visit information  _________________________________________________________________________________________________________________________________________________________  If you are asked by your clinic team to have your blood pressure checked:  [facility] do offer several locations for blood pressure checks. Please follow the below link to schedule an appointment. Scheduling an appointment at the pharmacy for a blood pressure check is now preferred.    Appointment Plus " ([email or web address])  _________________________________________________________________________________________________________________________________________________________  Important contact and scheduling information:  Please call our contact center at [phone] to schedule your next appointments.  To find a lab location near you, please call [phone].  For any nursing questions or concerns call Fay Longoria LPN at [phone] or Deisy Solano RN at [phone]  Please call during clinic hours Monday through Friday 8:00a - 4:00p if you have questions or you can contact us via [employer] at anytime and we will reply during clinic hours.    Lab results will be communicated through My Chart or letter (if My Chart not used). Please call the clinic if you have not received communication after 1 week or if you have any questions.?  Clinic Fax: [phone]    _Interested in working with a health ?  Health coaches work with you to improve your overall health and wellbeing.  They look at the whole person, and may involve discussion of different areas of life, including, but not limited to the four pillars of health (sleep, exercise, nutrition, and stress management). Discuss with your care team if you would like to start working a health .  Health Coaching-3 Pack: Schedule by calling [phone]    $99 for three health coaching visits    Visits may be done in person or via phone    Coaching is a partnership between the  and the client; Coaches do not prescribe or diagnose    Coaching helps inspire the client to reach his/her personal goals   _________________________________________________________________________________________________________________________________________________________  __________  [facility] Get Moving [facility]  Our team of physical therapists is trained to help you understand and take control of your condition. They will perform a thorough  evaluation to determine your ability for activity and develop a customized plan to fit your goals and physical ability.  Scheduling: Unsure if the Get Moving program is right for you? Discuss the program with your medical provider or diabetes educator. You can also call us at [phone] to ask questions or schedule an appointment.   [facility]  ____________________________________________________________________________________________________________________________________________________________________________        Thank you,   [facility] Comprehensive Weight Management Team

## 2025-01-28 NOTE — LETTER
2025       RE: Briana Gordon  [address]  [place] [address]     Dear Colleague,    Thank you for referring your patient, Briana Gordon, to the [facility]NAG[facility] [place] [facility]. Please see a copy of my visit note below.      Return Medical Weight Management Note     Briana Gordon  MRN:  [id number]  :  1995  RENY:  2025    Dear Chris Carrillo MD,    I had the pleasure of seeing your patient Briana Gordon. She is a 29 year old female who I am continuing to see for treatment of obesity related to:        2024     1:08 PM   --   I have the following health issues associated with obesity Pre-Diabetes    High Cholesterol    Asthma   I have the following symptoms associated with obesity Depression    Hip Pain       Assessment & Plan  Problem List Items Addressed This Visit       Diabetes mellitus, type 2 (H)    Relevant Medications    Semaglutide, 1 MG/DOSE, (OZEMPIC) 4 MG/3ML pen    Other Relevant Orders    Hemoglobin A1c    Comprehensive metabolic panel    Parathyroid Hormone Intact    Vitamin D Deficiency     Other Visit Diagnoses       Class 3 severe obesity with serious comorbidity and body mass index (BMI) of 45.0 to 49.9 in adult, unspecified obesity type (H)        Relevant Medications    Semaglutide, 1 MG/DOSE, (OZEMPIC) 4 MG/3ML pen    Other Relevant Orders    Hemoglobin A1c    Comprehensive metabolic panel    Parathyroid Hormone Intact    Vitamin D Deficiency           Plan  Continue ozempic 1mg, refills sent   Goals we discussed today:   Continuing to prioritize protein, explore more appetizing protein options   Continuing to prioritize fiber in fresh fruits and veggies   Explore ways to increase exercise throughout the week- could explore youtube exercises, weight resistance   Labs ordered today- A1c, vitamin d, pth, cmp   Follow up with Terra in 6 months  Dietician appointment not needed-  working regularly with [facility]   Keep up the excellent work!       INTERVAL HISTORY:  New MWM with Dr. Ndiaye 2/26/24  Has steadily put on 10lbs every year since high school. Weight was about 180 at age 16. Was on prozac for 10 years, switched to welbutrin and zoloft 2023. Family history of type 2 diabetes. Recently diagnosed with ADHD, started vyvanse Dec 2023. Fall 2023 [facility] binge eating disorder- in a 10 week program, gained about 20 lbs. Substantially improved binge eating- no binge eating since December. Uses myfitnesspal to track macros. Tracking calories can be triggering to eating disorder symptoms. Has dietitian meeting coming up in March.   Started ozempic      New to me 5/20/24:   Started adderall through psychiatry but stopped 2 weeks ago due to lack of efficacy.      Ozempic 0.5mg for the last 6 weeks, feel it is helpful in reducing cravings.      3 weeks ago weighed 283lbs, feels this is largely attributable to no longer binging after completing treatment through [facility] (binge free since November 2023) as well as the ozempic. Has not been able to weigh herself since as she's in the process of moving.      -increased to 1mg via mychart between visit      Last seen by me 8/21/24   8lbs weight loss since last visit, overall 32.8lbs loss since starting with our program.   -continued ozempic 1mg     Today in visit 1/28/25   15lbs weight loss since last visit - 49lbs weight loss since starting with program, about 15% total body weight loss.   Improved mobility, improved energy with this weight loss.   Type 2 dm: Has not been tracking blood sugar, A1c July 2024 was 6.1.       Wt Readings from Last 5 Encounters:   01/28/25 117.9 kg (260 lb)   08/21/24 124.8 kg (275 lb 3.2 oz)   07/01/24 128.4 kg (283 lb)   05/20/24 128.4 kg (283 lb)   02/26/24 139.7 kg (308 lb)       Anti-obesity medication history    Current:   Ozempic 1mg- continuing to tolerate, feels it's continuing to be  helpful with reducing cravings, reducing binge eating frequency.  Denies nausea, constipation.   Metformin- 1000mg daily- through pcp, continuing to tolerate- feels it was interestingly helpful with long covid symptoms (didn't take for a few days 2024 summer and felt long-covid symptoms recur)   Bupropion 150mg- through pcp- continuing to tolerate         GERD symptoms: well managed with omeprazole 20mg daily       Recent diet changes: Hasn't been able to follow up with [facility] in a month, wanting to schedule a follow up appointment     Protein- hoping to improve, notices her blood sugar feels more stable on days she's getting more protein. Struggling to prioritize protein the last month- Used to hit protein goals with protein shakes, but currently these are not tasting good to her any longer. Increasing beef jerky lately.     Water - sipping through large water jug throughout the day.     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, dishes, cooking, vacuming, sweeping, dusting, laundry, making the bed, taking care of cats, sometimes going for a walk with clients),     Vitamins/Labs:  labs ordered at previous visit, discussed getting these done      Pregnancy: not sexually active in situation where pregnancy is possible     CURRENT WEIGHT:   260 lbs 0 oz    Initial Weight (lbs): 309 lbs  Last Visits Weight: 124.7 kg (275 lb)  Cumulative weight loss (lbs): 49  Weight Loss Percentage: 15.86%        8/21/2024     8:14 AM   Changes and Difficulties   I have made the following changes to my diet since my last visit: Increase in fiber   With regards to my diet, I am still struggling with: Getting enough protein   I have made the following changes to my activity/exercise since my last visit: Started a new job   With regards to my activity/exercise, I am still struggling with: Getting multiple walks weekly, strength training  "            MEDICATIONS:   Current Outpatient Medications   Medication Sig Dispense Refill     Semaglutide, 1 MG/DOSE, (OZEMPIC) 4 MG/3ML pen Inject 1 mg subcutaneously every 7 days. 9 mL 2     buPROPion (WELLBUTRIN XL) 150 MG 24 hr tablet Take 1 tablet (150 mg) by mouth every morning 90 tablet 1     metFORMIN (GLUCOPHAGE XR) 500 MG 24 hr tablet Take 2 tablets (1,000 mg) by mouth daily (with dinner). 180 tablet 1     omeprazole (PRILOSEC) 20 MG DR capsule Take 1 capsule (20 mg) by mouth daily 30 capsule 0     sertraline (ZOLOFT) 50 MG tablet Take 1 tablet (50 mg) by mouth daily 90 tablet 1           8/21/2024     8:14 AM   Weight Loss Medication History Reviewed With Patient   Which weight loss medications are you currently taking on a regular basis? Ozempic   Are you having any side effects from the weight loss medication that we have prescribed you? Yes   If you are having side effects please describe: Nausea, loss of appetite               2/24/2024    12:41 PM   EVERARDO Score (Last Two)   EVERARDO Raw Score 35   Activation Score 72.1   EVERARDO Level 3         PHYSICAL EXAM:  Objective   Ht 1.626 m (5' 4\")   Wt 117.9 kg (260 lb)   BMI 44.63 kg/m      Vitals - Patient Reported  Pain Score: No Pain (0)      Vitals:  No vitals were obtained today due to virtual visit.    GENERAL: alert and no distress  EYES: Eyes grossly normal to inspection.  No discharge or erythema, or obvious scleral/conjunctival abnormalities.  RESP: No audible wheeze, cough, or visible cyanosis.    SKIN: Visible skin clear. No significant rash, abnormal pigmentation or lesions.  NEURO: Cranial nerves grossly intact.  Mentation and speech appropriate for age.  PSYCH: Appropriate affect, tone, and pace of words        Sincerely,    Terra Bell PA-C      18 minutes spent by me on the date of the encounter doing chart review, history and exam, documentation and further activities per the note    The longitudinal plan of care for the " diagnosis(es)/condition(s) as documented were addressed during this visit. Due to the added complexity in care, I will continue to support Briana in the subsequent management and with ongoing continuity of care.       Again, thank you for allowing me to participate in the care of your patient.      Sincerely,    Terra Bell PA-C

## 2025-01-28 NOTE — NURSING NOTE
Is the patient currently in the state of [place]? YES    Visit mode:VIDEO    If the visit is dropped, the patient can be reconnected by: VIDEO VISIT: Text to cell phone:   Telephone Information:   Mobile [phone]       Will anyone else be joining the visit? NO  (If patient encounters technical issues they should call [phone])    How would you like to obtain your AVS? MyChart    Are changes needed to the allergy or medication list? No    Are refills needed on medications prescribed by this physician? NO    Reason for visit: ERMIAS LAZAR

## 2025-02-20 ENCOUNTER — TELEPHONE (OUTPATIENT)
Dept: ENDOCRINOLOGY | Facility: CLINIC | Age: 30
End: 2025-02-20
Payer: COMMERCIAL

## 2025-02-20 NOTE — TELEPHONE ENCOUNTER
Prior Authorization Approval    Medication: OZEMPIC (1 MG/DOSE) 4 MG/3ML SC SOPN  Authorization Effective Date: 1/21/2025  Authorization Expiration Date: 2/20/2026  Approved Dose/Quantity: 3ml per 28 days  Reference #: [id number]   Insurance Company: [employer] Non-Specialty PA's - Phone [phone] Fax [phone]  Expected CoPay: $    CoPay Card Available:      Financial Assistance Needed: Unknown  Which Pharmacy is filling the prescription: [id number] - [place], [place] - [address]  Pharmacy Notified: Not needed  Patient Notified: Not needed

## 2025-03-07 SDOH — HEALTH STABILITY: PHYSICAL HEALTH: ON AVERAGE, HOW MANY DAYS PER WEEK DO YOU ENGAGE IN MODERATE TO STRENUOUS EXERCISE (LIKE A BRISK WALK)?: 2 DAYS

## 2025-03-07 SDOH — HEALTH STABILITY: PHYSICAL HEALTH: ON AVERAGE, HOW MANY MINUTES DO YOU ENGAGE IN EXERCISE AT THIS LEVEL?: 20 MIN

## 2025-03-07 ASSESSMENT — ANXIETY QUESTIONNAIRES
3. WORRYING TOO MUCH ABOUT DIFFERENT THINGS: MORE THAN HALF THE DAYS
5. BEING SO RESTLESS THAT IT IS HARD TO SIT STILL: NOT AT ALL
4. TROUBLE RELAXING: SEVERAL DAYS
1. FEELING NERVOUS, ANXIOUS, OR ON EDGE: MORE THAN HALF THE DAYS
GAD7 TOTAL SCORE: 9
6. BECOMING EASILY ANNOYED OR IRRITABLE: SEVERAL DAYS
7. FEELING AFRAID AS IF SOMETHING AWFUL MIGHT HAPPEN: SEVERAL DAYS
2. NOT BEING ABLE TO STOP OR CONTROL WORRYING: MORE THAN HALF THE DAYS
GAD7 TOTAL SCORE: 9
GAD7 TOTAL SCORE: 9
8. IF YOU CHECKED OFF ANY PROBLEMS, HOW DIFFICULT HAVE THESE MADE IT FOR YOU TO DO YOUR WORK, TAKE CARE OF THINGS AT HOME, OR GET ALONG WITH OTHER PEOPLE?: SOMEWHAT DIFFICULT
7. FEELING AFRAID AS IF SOMETHING AWFUL MIGHT HAPPEN: SEVERAL DAYS
IF YOU CHECKED OFF ANY PROBLEMS ON THIS QUESTIONNAIRE, HOW DIFFICULT HAVE THESE PROBLEMS MADE IT FOR YOU TO DO YOUR WORK, TAKE CARE OF THINGS AT HOME, OR GET ALONG WITH OTHER PEOPLE: SOMEWHAT DIFFICULT

## 2025-03-07 ASSESSMENT — PATIENT HEALTH QUESTIONNAIRE - PHQ9
SUM OF ALL RESPONSES TO PHQ QUESTIONS 1-9: 9
SUM OF ALL RESPONSES TO PHQ QUESTIONS 1-9: 9
10. IF YOU CHECKED OFF ANY PROBLEMS, HOW DIFFICULT HAVE THESE PROBLEMS MADE IT FOR YOU TO DO YOUR WORK, TAKE CARE OF THINGS AT HOME, OR GET ALONG WITH OTHER PEOPLE: SOMEWHAT DIFFICULT

## 2025-03-07 ASSESSMENT — SOCIAL DETERMINANTS OF HEALTH (SDOH): HOW OFTEN DO YOU GET TOGETHER WITH FRIENDS OR RELATIVES?: ONCE A WEEK

## 2025-03-12 ENCOUNTER — OFFICE VISIT (OUTPATIENT)
Dept: FAMILY MEDICINE | Facility: CLINIC | Age: 30
End: 2025-03-12
Payer: COMMERCIAL

## 2025-03-12 VITALS
HEART RATE: 86 BPM | OXYGEN SATURATION: 97 % | SYSTOLIC BLOOD PRESSURE: 113 MMHG | HEIGHT: 65 IN | TEMPERATURE: 97.7 F | BODY MASS INDEX: 42.57 KG/M2 | RESPIRATION RATE: 16 BRPM | WEIGHT: 255.5 LBS | DIASTOLIC BLOOD PRESSURE: 77 MMHG

## 2025-03-12 DIAGNOSIS — Z13.220 SCREENING FOR LIPID DISORDERS: ICD-10-CM

## 2025-03-12 DIAGNOSIS — Z00.00 ANNUAL PHYSICAL EXAM: Primary | ICD-10-CM

## 2025-03-12 DIAGNOSIS — E11.9 TYPE 2 DIABETES MELLITUS WITHOUT COMPLICATION, WITHOUT LONG-TERM CURRENT USE OF INSULIN (H): ICD-10-CM

## 2025-03-12 DIAGNOSIS — Z13.228 SCREENING FOR METABOLIC DISORDER: ICD-10-CM

## 2025-03-12 DIAGNOSIS — K64.9 HEMORRHOIDS, UNSPECIFIED HEMORRHOID TYPE: ICD-10-CM

## 2025-03-12 LAB
CHOLEST SERPL-MCNC: 219 MG/DL
CREAT UR-MCNC: 181 MG/DL
EST. AVERAGE GLUCOSE BLD GHB EST-MCNC: 120 MG/DL
FASTING STATUS PATIENT QL REPORTED: YES
HBA1C MFR BLD: 5.8 % (ref 0–5.6)
HDLC SERPL-MCNC: 53 MG/DL
HGB BLD-MCNC: 12.4 G/DL (ref 11.7–15.7)
LDLC SERPL CALC-MCNC: 132 MG/DL
MICROALBUMIN UR-MCNC: <12 MG/L
MICROALBUMIN/CREAT UR: NORMAL MG/G{CREAT}
NONHDLC SERPL-MCNC: 166 MG/DL
TRIGL SERPL-MCNC: 168 MG/DL

## 2025-03-12 NOTE — PROGRESS NOTES
Preventive Care Visit  [facility]  Curtis Thapa MD, Family Medicine      ASSESSMENT/PLAN:    Annual Exam/Preventive Issues   - Labs: Check Lipids, BMP, hemoglobin, A1C,  and Urine  - Immunizations: Give PCV20 today, indication DM2  - Cancer screenings:   Will return for Pap and pelvic. Informed of our new study for HPV self-swab. May schedule with Dr. Castle    -Specific concerns:     Diabetes with over 50 lbs of weight loss on Ozempic and Metformin. Feels well  Normal foot exam  Referred for eye evaluation  Check A1C   Check Urine  Angular Cheilitis per report. Not visualized due to mask  May try Lotrimin (anti-fungal) and also Hydrocortisone (anti-inflammatory) creams  Mental health  Stable on Zoloft and Wellbutrin  Visiting with Therapist in 2 days from now  Likely hemorrhoids per history of intermittent blood in stool, small amounts after hard stools.   Try Preparation H  Check a hemoglobin  Sent to Colorectal specialists    -Follow up: with Dr. Castle to discuss Pap smear vs new study for HPV testing    Curtis Thapa MD  Family Medicine and Sports Medicine      INTRODUCTION:     Briana is here for an annual preventive exam. I met her a few years ago for a brief visit for sinusitis but have never seen her for an annual exam.  She is accompanied by a sibling who lives with her, Lenny.      primary medical issues have been:   depression, on the below medications.  Has a therapist.  Upcoming appointment in 2 days.  Was feeling quite low a few weeks ago but that is when the sunlight was down and the days were much colder.  She is feeling better now.  Feels safe.  Has a good support system.      Diabetes mellitus 2 with obesity.    on Ozempic for the past year.  Also on metformin.  Has lost 50 pounds over the past year with Ozempic.  No current concerns.      Reports occasional blood noted in her stools after hard stool.  She thinks she may have a hemorrhoid.    Also, has some sores at the side of her  mouth that she feels as angular cheilitis.  She does not want examination today as she does not wish to take off her mask      Current Outpatient Medications   Medication Sig Dispense Refill    buPROPion (WELLBUTRIN XL) 150 MG 24 hr tablet Take 1 tablet (150 mg) by mouth every morning 90 tablet 1    metFORMIN (GLUCOPHAGE XR) 500 MG 24 hr tablet Take 2 tablets (1,000 mg) by mouth daily (with dinner). 180 tablet 1    omeprazole (PRILOSEC) 20 MG DR capsule Take 1 capsule (20 mg) by mouth daily 30 capsule 0    Semaglutide, 1 MG/DOSE, (OZEMPIC) 4 MG/3ML pen Inject 1 mg subcutaneously every 7 days. 9 mL 2    sertraline (ZOLOFT) 50 MG tablet Take 1 tablet (50 mg) by mouth daily 90 tablet 1       Patient Active Problem List   Diagnosis    Depression    CAROLINE (generalized anxiety disorder)    Migraine with aura and without status migrainosus, not intractable    Non morbid obesity due to excess calories    Morbid obesity (H)    Diabetes mellitus, type 2 (H)       Social History     Social History Narrative    Works as a PCA for 2 clients.      to Nathalie. They have a dog, no children and just purchased their first house.          3/7/2025   General Health   How would you rate your overall physical health? (!) FAIR   Feel stress (tense, anxious, or unable to sleep) Rather much   (!) STRESS CONCERN      3/7/2025   Nutrition   Three or more servings of calcium each day? (!) I DON'T KNOW   Diet: Regular (no restrictions)    I don't know   How many servings of fruit and vegetables per day? (!) 2-3   How many sweetened beverages each day? 0-1       Multiple values from one day are sorted in reverse-chronological order         3/7/2025   Exercise   Days per week of moderate/strenous exercise 2 days   Average minutes spent exercising at this level 20 min   (!) EXERCISE CONCERN      3/7/2025   Social Factors   Frequency of gathering with friends or relatives Once a week   Worry food won't last until get money to buy more No   Food  "not last or not have enough money for food? No   Do you have housing? (Housing is defined as stable permanent housing and does not include staying ouside in a car, in a tent, in an abandoned building, in an overnight shelter, or couch-surfing.) Yes   Are you worried about losing your housing? No   Lack of transportation? No   Unable to get utilities (heat,electricity)? No         3/7/2025   Dental   Dentist two times every year? Yes         Today's PHQ-9 Score:       3/7/2025    12:54 PM   PHQ-9 SCORE   PHQ-9 Total Score MyChart 9 (Mild depression)   PHQ-9 Total Score 9        Patient-reported         3/7/2025   Substance Use   Alcohol more than 3/day or more than 7/wk Not Applicable   Do you use any other substances recreationally? (!) CANNABIS PRODUCTS     Social History     Tobacco Use    Smoking status: Never    Smokeless tobacco: Never   Vaping Use    Vaping status: Never Used   Substance Use Topics    Alcohol use: Yes     Comment: less than 1 drink a week.    Drug use: Yes     Types: Other     Comment: Delta 8 gummie, 5 mg, 4 times a week.                3/7/2025   STI Screening   New sexual partner(s) since last STI/HIV test? (!) YES. Only one partner, a female. No concerns about STIs.            8/8/2022    11:28 AM   PAP / HPV   PAP Negative for Intraepithelial Lesion or Malignancy (NILM)            3/7/2025   Contraception/Family Planning   Questions about contraception or family planning No        Reviewed and updated as needed this visit by Provider   Tobacco  Allergies  Meds  Problems  Med Hx  Surg Hx  Fam Hx               Objective    Exam  /77 (BP Location: Left arm, Patient Position: Sitting, Cuff Size: Adult Large)   Pulse 86   Temp 97.7  F (36.5  C) (Temporal)   Resp 16   Ht 1.644 m (5' 4.72\")   Wt 115.9 kg (255 lb 8 oz)   LMP 02/12/2025 (Approximate)   SpO2 97%   BMI 42.88 kg/m     Estimated body mass index is 42.88 kg/m  as calculated from the following:    Height as of this " "encounter: 1.644 m (5' 4.72\").    Weight as of this encounter: 115.9 kg (255 lb 8 oz).    Physical Exam  GENERAL: Appears well. Wearing mask.   EYES: Eyes grossly normal to inspection, PERRL and conjunctivae and sclerae normal  HENT: ear canals and TM's normal.   NECK: no adenopathy, no asymmetry, masses, or scars  RESP: lungs clear to auscultation - no rales, rhonchi or wheezes  CV: regular rate and rhythm, normal S1 S2, no S3 or S4, no murmur, click or rub, no peripheral edema  ABDOMEN: soft, nontender, no hepatosplenomegaly, no masses and bowel sounds normal  MS: no gross musculoskeletal defects noted, no edema.   Feet with normal sensation throughout to monofilament testing. No skin breakdown.   SKIN: no suspicious lesions or rashes  NEURO: Normal strength and tone, mentation intact and speech normal  PSYCH: mentation appears normal, affect normal/bright        Signed Electronically by: Curtis Thapa MD    "

## 2025-03-12 NOTE — NURSING NOTE
Prior to immunization administration, verified patients identity using patient s name and date of birth. Please see Immunization Activity for additional information.     Screening Questionnaire for Adult Immunization    Are you sick today?   No   Do you have allergies to medications, food, a vaccine component or latex?   No   Have you ever had a serious reaction after receiving a vaccination?   No   Do you have a long-term health problem with heart, lung, kidney, or metabolic disease (e.g., diabetes), asthma, a blood disorder, no spleen, complement component deficiency, a cochlear implant, or a spinal fluid leak?  Are you on long-term aspirin therapy?   No   Do you have cancer, leukemia, HIV/AIDS, or any other immune system problem?   No   Do you have a parent, brother, or sister with an immune system problem?   No   In the past 3 months, have you taken medications that affect  your immune system, such as prednisone, other steroids, or anticancer drugs; drugs for the treatment of rheumatoid arthritis, Crohn s disease, or psoriasis; or have you had radiation treatments?   No   Have you had a seizure, or a brain or other nervous system problem?   No   During the past year, have you received a transfusion of blood or blood    products, or been given immune (gamma) globulin or antiviral drug?   No   For women: Are you pregnant or is there a chance you could become       pregnant during the next month?   No   Have you received any vaccinations in the past 4 weeks?   No     Immunization questionnaire answers were all negative.      Patient instructed to remain in clinic for 15 minutes afterwards, and to report any adverse reactions.     Screening performed by Kasey Prince LPN on 3/12/2025 at 10:01 AM.

## 2025-03-12 NOTE — PATIENT INSTRUCTIONS
ASSESSMENT/PLAN:    Annual Exam/Preventive Issues   - Labs: Check Lipids, BMP, hemoglobin, A1C,  and Urine  - Immunizations: Give PCV20 today, indication DM2  - Cancer screenings:   Will return for Pap and pelvic. Informed of our new study for HPV self-swab. May schedule with Dr. Castle    -Specific concerns:     Diabetes with over 50 lbs of weight loss on Ozempic and Metformin. Feels well  Normal foot exam  Referred for eye evaluation  Check A1C   Check Urine  Angular Cheilitis per report. Not visualized due to mask  May try Lotrimin (anti-fungal) and also Hydrocortisone (anti-inflammatory) creams  Mental health  Stable on Zoloft and Wellbutrin  Visiting with Therapist in 2 days from now  Likely hemorrhoids per history of intermittent blood in stool, small amounts after hard stools.   Try Preparation H  Check a hemoglobin  Sent to Colorectal specialists    -Follow up: with Dr. Castle to discuss Pap smear vs new study for HPV testing    Curtis Thapa MD  Family Medicine and Sports Medicine

## 2025-05-01 ENCOUNTER — OFFICE VISIT (OUTPATIENT)
Dept: OPHTHALMOLOGY | Facility: CLINIC | Age: 30
End: 2025-05-01
Payer: COMMERCIAL

## 2025-05-01 DIAGNOSIS — E11.9 TYPE 2 DIABETES MELLITUS WITHOUT COMPLICATION, WITHOUT LONG-TERM CURRENT USE OF INSULIN (H): Primary | ICD-10-CM

## 2025-05-01 ASSESSMENT — CONF VISUAL FIELD
OD_SUPERIOR_NASAL_RESTRICTION: 0
OS_SUPERIOR_TEMPORAL_RESTRICTION: 0
OD_NORMAL: 1
OD_INFERIOR_TEMPORAL_RESTRICTION: 0
OD_SUPERIOR_TEMPORAL_RESTRICTION: 0
OS_NORMAL: 1
OS_SUPERIOR_NASAL_RESTRICTION: 0
OS_INFERIOR_TEMPORAL_RESTRICTION: 0
METHOD: COUNTING FINGERS
OS_INFERIOR_NASAL_RESTRICTION: 0
OD_INFERIOR_NASAL_RESTRICTION: 0

## 2025-05-01 ASSESSMENT — EXTERNAL EXAM - RIGHT EYE: OD_EXAM: NORMAL

## 2025-05-01 ASSESSMENT — SLIT LAMP EXAM - LIDS
COMMENTS: TR BLEPH
COMMENTS: TR BLEPH

## 2025-05-01 ASSESSMENT — REFRACTION_WEARINGRX
OD_AXIS: 046
OS_AXIS: 129
OD_SPHERE: -1.50
OD_CYLINDER: +1.00
OS_SPHERE: -1.50
OS_CYLINDER: +1.00
SPECS_TYPE: SVL

## 2025-05-01 ASSESSMENT — VISUAL ACUITY
OS_CC: 20/15
OD_CC+: -1
METHOD_MR: PT DECLINES REFRACTION
OD_CC: 20/20
CORRECTION_TYPE: GLASSES
OS_CC+: -1
METHOD: SNELLEN - LINEAR

## 2025-05-01 ASSESSMENT — TONOMETRY
IOP_METHOD: TONOPEN
OS_IOP_MMHG: 15
OD_IOP_MMHG: 15

## 2025-05-01 ASSESSMENT — CUP TO DISC RATIO
OS_RATIO: 0.35
OD_RATIO: 0.30

## 2025-05-01 ASSESSMENT — EXTERNAL EXAM - LEFT EYE: OS_EXAM: NORMAL

## 2025-05-01 NOTE — PROGRESS NOTES
A/P  1.) Type 2 DM without ophthalmic manifestation OU  -Last A1c 5.8. No known h/o diabetic retinopathy  -Reviewed effects of DM on the eyes and importance of good blood sugar control  -Monitor annually with dilation    2.) Refractive error  -Wearing glasses full time, happy with current pair    Monitor 1-2 years comprehensive, sooner prn    I have confirmed the patient's CC, HPI and reviewed Past Medical History, Past Surgical History, Social History, Family History, Problem List, Medication List and agree with Tech note.     Melba Verde, [place] [facility]OO [place]

## 2025-05-01 NOTE — NURSING NOTE
Chief Complaints and History of Present Illnesses   Patient presents with    Diabetic Eye Exam     Briana Gordon is being seen for a consult today by the request of Dr. Thapa for Diabetic eye exam.      Chief Complaint(s) and History of Present Illness(es)       Diabetic Eye Exam              Associated symptoms: headaches and flashes.  Negative for floaters    Diabetes Type: Type 2    Treatments tried: eye drops    Pain scale: 0/10    Comments: Briana Gordon is being seen for a consult today by the request of Dr. Thapa for Diabetic eye exam.               Comments    No vision complaints distance or near with glasses.  She gets yellow/green discharge in the winter months.   Denies eye pain, but has occasional dryness.   No floaters but gets flashes of light during migraines    Ocular Meds:   Pataday PRN each eye     DM2  LBS : Does not monitor.  Lab Results       Component                Value               Date                       A1C                      5.8                 03/12/2025                 A1C                      6.1                 07/01/2024                 A1C                      6.3                 01/16/2024                 A1C                      7.2                 08/15/2023               Robert Ho 8:49 AM May 1, 2025

## 2025-05-01 NOTE — PATIENT INSTRUCTIONS
When eye crusting returns, we would recommend the following:      Lid scrubs: These can help prevent buildup of debris on eyelids/eyelashes and help reduce inflammation of the eyelids (blepharitis). Use daily.  -Ocusoft Plus lid wipes  -Ocusoft Plus Hypochlor foaming lid cleanser  -Systane lid wipes  -Tranquileyes 1% Tea Tree Eyelid & Facial Cleanser by [employer]  -Avenova Lid   -Cliradex Lid Wipes  -We Love Eyes Foaming Tea Tree Cleanser  -Oasis-LID  N LASH pads.       Warm compresses: Use 1-2x/day for 5-10 minutes over closed eyelids  -Elayne mask  -Tranquileyes beaded mask  -Mibo heating pad  -[place] REST & RELIEF Eye Mask (Hot or Cold)  -I-RELIEF Therapy Mask  -OcuTherm Essentials Kit    You can also use a warm wet washcloth - however this frequently loses heat quickly and can dry your skin out a bit so we recommend any of the above re-usable beaded/gel eyemasks      To purchase these products you can look over-the-counter at [employer] or purchase online at the following websites:  -[email or web address]  -www.[employer]

## 2025-06-29 ENCOUNTER — HEALTH MAINTENANCE LETTER (OUTPATIENT)
Age: 30
End: 2025-06-29

## 2025-07-31 ENCOUNTER — OFFICE VISIT (OUTPATIENT)
Dept: URGENT CARE | Facility: URGENT CARE | Age: 30
End: 2025-07-31

## 2025-07-31 ENCOUNTER — ANCILLARY PROCEDURE (OUTPATIENT)
Dept: GENERAL RADIOLOGY | Facility: CLINIC | Age: 30
End: 2025-07-31
Attending: PHYSICIAN ASSISTANT
Payer: COMMERCIAL

## 2025-07-31 VITALS
DIASTOLIC BLOOD PRESSURE: 82 MMHG | SYSTOLIC BLOOD PRESSURE: 135 MMHG | OXYGEN SATURATION: 98 % | HEART RATE: 77 BPM | TEMPERATURE: 97.6 F | BODY MASS INDEX: 42.68 KG/M2 | HEIGHT: 64 IN | RESPIRATION RATE: 18 BRPM | WEIGHT: 250 LBS

## 2025-07-31 DIAGNOSIS — S99.922A TOE INJURY, LEFT, INITIAL ENCOUNTER: ICD-10-CM

## 2025-07-31 DIAGNOSIS — S99.922A TOE INJURY, LEFT, INITIAL ENCOUNTER: Primary | ICD-10-CM

## 2025-07-31 RX ORDER — IBUPROFEN 200 MG
400 TABLET ORAL ONCE
Status: COMPLETED | OUTPATIENT
Start: 2025-07-31 | End: 2025-07-31

## 2025-07-31 RX ADMIN — Medication 400 MG: at 11:14

## 2025-07-31 NOTE — LETTER
REPORT OF WORK COMP    Briana Gordon  [address] [place] [facility] [address]      PATIENT DATA    Employee Name: Briana Gordon      : 1995     #: [id number]    Work related injury: Yes  Employer at time of injury:   Employer contact & phone:   Employed elsewhere? No  Workers' Compensation Carrier/Managed Care Plan:      Today's date: 2025  Date of injury: 25  Date of first visit: 25    PROVIDER EVALUATION: Please fill in as needed.  Please give copy to employee for employer.    1. Diagnosis: Left toe nail avulsion    2. Treatment: rest, ice Ibuprofen.  3. Medication: Ibuprofen and Tylenol  NOTE: When ordering a medication, [place] Rules require Work Comp or WC on prescriptions.  5. Return to work date: 25   ** WITH RESTRICTIONS? Yes, with work restrictions: * Keep injury site clean and dry  * Other: avoid prolonged standing and walking.  DURATION OF LIMITATIONS: 8/3/25      RESTRICTIONS: Unlimited unless listed.  Restrictions apply to home and leisure also.  If work restrictions is not available, the employee is totally disabled.    Maximum Medical Improvement (Date): TBD  Any Permanent Partial Disability? Deferred to future exam/consult.    Medical Examiner: Norma Tamayo PA-C               Next appointment: 3 to 5 days with podiatry    CC: Employer, Managed Care Plan/Payor, Patient  Electronically signed

## 2025-07-31 NOTE — PROGRESS NOTES
"URGENT CARE VISIT:    SUBJECTIVE:   Chief Complaint   Patient presents with    Urgent Care    Work Comp    Foot Injury     Today dropped chair on left foot fist toe  Toe nail bent all the way back         Briana Gordon is a 29 year old female who presents with a chief complaint of left great toe pain.  Symptoms began this am. She dropped a chair on left foot at work. Her toe nail bent back.   Pain exacerbated by walking and movement Relieved by rest.  She treated it initially with no therapy. This is the first time this type of injury has occurred to this patient.     PMH:   Past Medical History:   Diagnosis Date    Depression     Diabetes mellitus, type 2 (H)     Generalized anxiety disorder      Allergies: Patient has no known allergies.   Medications:   Current Outpatient Medications   Medication Sig Dispense Refill    buPROPion (WELLBUTRIN XL) 150 MG 24 hr tablet Take 1 tablet (150 mg) by mouth every morning 90 tablet 1    metFORMIN (GLUCOPHAGE XR) 500 MG 24 hr tablet Take 2 tablets (1,000 mg) by mouth daily (with dinner). 180 tablet 1    omeprazole (PRILOSEC) 20 MG DR capsule Take 1 capsule (20 mg) by mouth daily 30 capsule 0    Semaglutide, 1 MG/DOSE, (OZEMPIC) 4 MG/3ML pen Inject 1 mg subcutaneously every 7 days. 9 mL 2    sertraline (ZOLOFT) 50 MG tablet Take 1 tablet (50 mg) by mouth daily 90 tablet 1     Social History:   Social History     Tobacco Use    Smoking status: Never    Smokeless tobacco: Never   Substance Use Topics    Alcohol use: Yes     Comment: less than 1 drink a week.       ROS:  General: negative  Skin: bleeding  Eyes: negative  Ears/Nose/Throat: negative  Respiratory: No shortness of breath, dyspnea on exertion, cough, or hemoptysis  Cardiovascular: negative  Gastrointestinal: negative  Genitourinary: negative  Musculoskeletal: left great toe pain  Neurologic: negative      OBJECTIVE:  /82   Pulse 77   Temp 97.6  F (36.4  C) (Temporal)   Resp 18   Ht 1.626 m (5' 4\")   Wt " 113.4 kg (250 lb)   SpO2 98%   BMI 42.91 kg/m    GENERAL APPEARANCE: healthy, alert and no distress  HEAD: atraumatic  MUSCULOSKELETAL: moderate TTP over left great toe. FROM  EXTREMITIES: peripheral pulses normal  SKIN: mild bleeding. Nail is 80% avulsed.   NEURO: sensation intact   PSYCH: normal mood and affect    IMAGING:  Results for orders placed or performed in visit on 07/31/25   XR Toe Left G/E 2 Views     Status: None    Narrative    EXAM: XR TOE LEFT G/E 2 VIEWS  LOCATION: [facility]  DATE: 7/31/2025    INDICATION:  Toe injury, left, initial encounter  COMPARISON: None.      Impression    IMPRESSION: No acute displaced fracture. Radiodensities along the great toe plantar skin surface could be external to the patient or represents superficial foreign bodies.        ASSESSMENT:    ICD-10-CM    1. Toe injury, left, initial encounter  S99.922A XR Toe Left G/E 2 Views     ibuprofen (ADVIL/MOTRIN) tablet 400 mg     Orthopedic  Referral          PLAN:  Patient Instructions   Patient was educated on the natural course of injury. No acute fracture of toe. Conservative measures include  rest, ice, compression, elevation, and over-the-counter analgesics (Tylenol 500 mg and Ibuprofen 400 mg every 6 hours) as needed. See podiatry for follow-up. Seek emergency care if you develop severe pain/swelling, inability to move extremity, skin paleness, or weakness.     Patient verbalized understanding and is agreeable to plan. The patient was discharged ambulatory and in stable condition.    Norma Tamayo PA-C on 7/31/2025 at 11:49 AM

## 2025-07-31 NOTE — PROGRESS NOTES
Urgent Care Clinic Visit    Chief Complaint   Patient presents with    Urgent Care    Work Comp    Foot Injury     Today dropped chair on left foot fist toe  Toe nail bent all the way back               Review of last Tetanus vaccine: Tetanus vaccine has been given within past 10 years  5/14/2015 7/31/2025    10:54 AM   Additional Questions   Roomed by essie lal

## 2025-07-31 NOTE — PATIENT INSTRUCTIONS
Patient was educated on the natural course of injury. No acute fracture of toe. Conservative measures include  rest, ice, compression, elevation, and over-the-counter analgesics (Tylenol 500 mg and Ibuprofen 400 mg every 6 hours) as needed. See podiatry for follow-up. Seek emergency care if you develop severe pain/swelling, inability to move extremity, skin paleness, or weakness.

## 2025-08-11 ENCOUNTER — OFFICE VISIT (OUTPATIENT)
Dept: PODIATRY | Facility: CLINIC | Age: 30
End: 2025-08-11
Attending: PHYSICIAN ASSISTANT
Payer: COMMERCIAL

## 2025-08-11 DIAGNOSIS — S91.209A AVULSION OF TOENAIL, INITIAL ENCOUNTER: Primary | ICD-10-CM

## 2025-08-11 DIAGNOSIS — S99.922A TOE INJURY, LEFT, INITIAL ENCOUNTER: ICD-10-CM

## 2025-08-11 PROCEDURE — 99203 OFFICE O/P NEW LOW 30 MIN: CPT | Performed by: PODIATRIST

## 2025-08-25 DIAGNOSIS — E11.9 TYPE 2 DIABETES MELLITUS WITHOUT COMPLICATION, WITHOUT LONG-TERM CURRENT USE OF INSULIN (H): ICD-10-CM

## 2025-08-28 RX ORDER — METFORMIN HYDROCHLORIDE 500 MG/1
1000 TABLET, EXTENDED RELEASE ORAL
Qty: 60 TABLET | Refills: 0 | Status: SHIPPED | OUTPATIENT
Start: 2025-08-28